# Patient Record
Sex: FEMALE | Race: BLACK OR AFRICAN AMERICAN | NOT HISPANIC OR LATINO | URBAN - METROPOLITAN AREA
[De-identification: names, ages, dates, MRNs, and addresses within clinical notes are randomized per-mention and may not be internally consistent; named-entity substitution may affect disease eponyms.]

---

## 2017-01-02 ENCOUNTER — EMERGENCY (EMERGENCY)
Facility: HOSPITAL | Age: 67
LOS: 1 days | Discharge: HOME | End: 2017-01-02
Attending: EMERGENCY MEDICINE
Payer: MEDICARE

## 2017-01-02 VITALS
HEIGHT: 59 IN | TEMPERATURE: 98 F | HEART RATE: 60 BPM | SYSTOLIC BLOOD PRESSURE: 148 MMHG | WEIGHT: 128.97 LBS | DIASTOLIC BLOOD PRESSURE: 77 MMHG | OXYGEN SATURATION: 100 % | RESPIRATION RATE: 16 BRPM

## 2017-01-02 DIAGNOSIS — Z88.8 ALLERGY STATUS TO OTHER DRUGS, MEDICAMENTS AND BIOLOGICAL SUBSTANCES STATUS: ICD-10-CM

## 2017-01-02 DIAGNOSIS — R51 HEADACHE: ICD-10-CM

## 2017-01-02 DIAGNOSIS — R42 DIZZINESS AND GIDDINESS: ICD-10-CM

## 2017-01-02 DIAGNOSIS — R07.9 CHEST PAIN, UNSPECIFIED: ICD-10-CM

## 2017-01-02 DIAGNOSIS — I10 ESSENTIAL (PRIMARY) HYPERTENSION: ICD-10-CM

## 2017-01-02 LAB
ALBUMIN SERPL ELPH-MCNC: 3.5 G/DL — SIGNIFICANT CHANGE UP (ref 3.3–5)
ALP SERPL-CCNC: 83 U/L — SIGNIFICANT CHANGE UP (ref 40–120)
ALT FLD-CCNC: 29 U/L — SIGNIFICANT CHANGE UP (ref 12–78)
ANION GAP SERPL CALC-SCNC: 5 MMOL/L — SIGNIFICANT CHANGE UP (ref 5–17)
AST SERPL-CCNC: 30 U/L — SIGNIFICANT CHANGE UP (ref 15–37)
BASOPHILS # BLD AUTO: 0.1 K/UL — SIGNIFICANT CHANGE UP (ref 0–0.2)
BASOPHILS NFR BLD AUTO: 1.5 % — SIGNIFICANT CHANGE UP (ref 0–2)
BILIRUB SERPL-MCNC: 0.5 MG/DL — SIGNIFICANT CHANGE UP (ref 0.2–1.2)
BUN SERPL-MCNC: 16 MG/DL — SIGNIFICANT CHANGE UP (ref 7–23)
CALCIUM SERPL-MCNC: 9.1 MG/DL — SIGNIFICANT CHANGE UP (ref 8.5–10.1)
CHLORIDE SERPL-SCNC: 105 MMOL/L — SIGNIFICANT CHANGE UP (ref 96–108)
CK MB BLD-MCNC: 1 % — SIGNIFICANT CHANGE UP (ref 0–3.5)
CK MB CFR SERPL CALC: 2.1 NG/ML — SIGNIFICANT CHANGE UP (ref 0.5–3.6)
CK SERPL-CCNC: 221 U/L — HIGH (ref 26–192)
CO2 SERPL-SCNC: 32 MMOL/L — HIGH (ref 22–31)
CREAT SERPL-MCNC: 0.82 MG/DL — SIGNIFICANT CHANGE UP (ref 0.5–1.3)
EOSINOPHIL # BLD AUTO: 0.1 K/UL — SIGNIFICANT CHANGE UP (ref 0–0.5)
EOSINOPHIL NFR BLD AUTO: 1.2 % — SIGNIFICANT CHANGE UP (ref 0–6)
GLUCOSE SERPL-MCNC: 94 MG/DL — SIGNIFICANT CHANGE UP (ref 70–99)
HCT VFR BLD CALC: 39.3 % — SIGNIFICANT CHANGE UP (ref 34.5–45)
HGB BLD-MCNC: 13.7 G/DL — SIGNIFICANT CHANGE UP (ref 11.5–15.5)
LYMPHOCYTES # BLD AUTO: 2.8 K/UL — SIGNIFICANT CHANGE UP (ref 1–3.3)
LYMPHOCYTES # BLD AUTO: 46.6 % — HIGH (ref 13–44)
MCHC RBC-ENTMCNC: 29.1 PG — SIGNIFICANT CHANGE UP (ref 27–34)
MCHC RBC-ENTMCNC: 34.9 GM/DL — SIGNIFICANT CHANGE UP (ref 32–36)
MCV RBC AUTO: 83.6 FL — SIGNIFICANT CHANGE UP (ref 80–100)
MONOCYTES # BLD AUTO: 0.3 K/UL — SIGNIFICANT CHANGE UP (ref 0–0.9)
MONOCYTES NFR BLD AUTO: 4.7 % — SIGNIFICANT CHANGE UP (ref 2–14)
NEUTROPHILS # BLD AUTO: 2.7 K/UL — SIGNIFICANT CHANGE UP (ref 1.8–7.4)
NEUTROPHILS NFR BLD AUTO: 46 % — SIGNIFICANT CHANGE UP (ref 43–77)
PLATELET # BLD AUTO: 185 K/UL — SIGNIFICANT CHANGE UP (ref 150–400)
POTASSIUM SERPL-MCNC: 4 MMOL/L — SIGNIFICANT CHANGE UP (ref 3.5–5.3)
POTASSIUM SERPL-SCNC: 4 MMOL/L — SIGNIFICANT CHANGE UP (ref 3.5–5.3)
PROT SERPL-MCNC: 7.5 GM/DL — SIGNIFICANT CHANGE UP (ref 6–8.3)
RBC # BLD: 4.7 M/UL — SIGNIFICANT CHANGE UP (ref 3.8–5.2)
RBC # FLD: 11.8 % — SIGNIFICANT CHANGE UP (ref 11–15)
SODIUM SERPL-SCNC: 142 MMOL/L — SIGNIFICANT CHANGE UP (ref 135–145)
TROPONIN I SERPL-MCNC: <.015 NG/ML — SIGNIFICANT CHANGE UP (ref 0.01–0.04)
WBC # BLD: 5.9 K/UL — SIGNIFICANT CHANGE UP (ref 3.8–10.5)
WBC # FLD AUTO: 5.9 K/UL — SIGNIFICANT CHANGE UP (ref 3.8–10.5)

## 2017-01-02 PROCEDURE — 70450 CT HEAD/BRAIN W/O DYE: CPT | Mod: 26

## 2017-01-02 PROCEDURE — 71010: CPT | Mod: 26

## 2017-01-02 PROCEDURE — 99285 EMERGENCY DEPT VISIT HI MDM: CPT

## 2017-01-02 NOTE — ED ADULT NURSE NOTE - OBJECTIVE STATEMENT
on and pff dizziness for weeks left side of head,pulling down to left side of face with tightness,denies chest pain

## 2017-01-02 NOTE — ED PROVIDER NOTE - OBJECTIVE STATEMENT
66yoF; with pmh signif for HTN, HLD; now p/w lightheadedness (x2 days).  c/o headache--frontal headache.  denies blurry vision/double vision.  denies focal weakness. denies numbness/tingling. c/o chest pain--sscp, non-radiating, non-exertional, non-pleuritic, associated with palpitations. denies sob, diaphoresis, or nausea. denies f/c/s. denies cough.

## 2017-01-02 NOTE — ED ADULT TRIAGE NOTE - CHIEF COMPLAINT QUOTE
Patient reports 1 week of dizziness, visual changes and "heaviness in her head."  Denies fall, denies striking head. Symptoms comes and go

## 2017-01-03 VITALS
RESPIRATION RATE: 13 BRPM | DIASTOLIC BLOOD PRESSURE: 60 MMHG | HEART RATE: 49 BPM | SYSTOLIC BLOOD PRESSURE: 121 MMHG | OXYGEN SATURATION: 99 %

## 2017-01-03 LAB
APTT BLD: 28.3 SEC — SIGNIFICANT CHANGE UP (ref 27.5–37.4)
CK MB BLD-MCNC: 0.9 % — SIGNIFICANT CHANGE UP (ref 0–3.5)
CK MB CFR SERPL CALC: 2 NG/ML — SIGNIFICANT CHANGE UP (ref 0.5–3.6)
CK SERPL-CCNC: 218 U/L — HIGH (ref 26–192)
INR BLD: 1.04 RATIO — SIGNIFICANT CHANGE UP (ref 0.88–1.16)
PROTHROM AB SERPL-ACNC: 11.7 SEC — SIGNIFICANT CHANGE UP (ref 10–13.1)
TROPONIN I SERPL-MCNC: <.015 NG/ML — SIGNIFICANT CHANGE UP (ref 0.01–0.04)

## 2017-01-03 PROCEDURE — 93010 ELECTROCARDIOGRAM REPORT: CPT

## 2020-11-20 ENCOUNTER — EMERGENCY (EMERGENCY)
Facility: HOSPITAL | Age: 70
LOS: 0 days | Discharge: HOME | End: 2020-11-20
Attending: EMERGENCY MEDICINE
Payer: MEDICARE

## 2020-11-20 VITALS
DIASTOLIC BLOOD PRESSURE: 77 MMHG | WEIGHT: 130.07 LBS | HEIGHT: 59 IN | OXYGEN SATURATION: 100 % | HEART RATE: 58 BPM | SYSTOLIC BLOOD PRESSURE: 137 MMHG | RESPIRATION RATE: 16 BRPM | TEMPERATURE: 98 F

## 2020-11-20 VITALS
DIASTOLIC BLOOD PRESSURE: 66 MMHG | OXYGEN SATURATION: 100 % | HEART RATE: 61 BPM | RESPIRATION RATE: 12 BRPM | TEMPERATURE: 98 F | SYSTOLIC BLOOD PRESSURE: 141 MMHG

## 2020-11-20 DIAGNOSIS — R07.9 CHEST PAIN, UNSPECIFIED: ICD-10-CM

## 2020-11-20 DIAGNOSIS — Z88.8 ALLERGY STATUS TO OTHER DRUGS, MEDICAMENTS AND BIOLOGICAL SUBSTANCES: ICD-10-CM

## 2020-11-20 DIAGNOSIS — R06.02 SHORTNESS OF BREATH: ICD-10-CM

## 2020-11-20 DIAGNOSIS — I10 ESSENTIAL (PRIMARY) HYPERTENSION: ICD-10-CM

## 2020-11-20 LAB
ALBUMIN SERPL ELPH-MCNC: 3.5 G/DL — SIGNIFICANT CHANGE UP (ref 3.3–5)
ALP SERPL-CCNC: 78 U/L — SIGNIFICANT CHANGE UP (ref 40–120)
ALT FLD-CCNC: 25 U/L — SIGNIFICANT CHANGE UP (ref 12–78)
ANION GAP SERPL CALC-SCNC: 8 MMOL/L — SIGNIFICANT CHANGE UP (ref 5–17)
APTT BLD: 29.4 SEC — SIGNIFICANT CHANGE UP (ref 27.5–35.5)
AST SERPL-CCNC: 28 U/L — SIGNIFICANT CHANGE UP (ref 15–37)
BASOPHILS # BLD AUTO: 0.01 K/UL — SIGNIFICANT CHANGE UP (ref 0–0.2)
BASOPHILS NFR BLD AUTO: 0.2 % — SIGNIFICANT CHANGE UP (ref 0–2)
BILIRUB SERPL-MCNC: 0.6 MG/DL — SIGNIFICANT CHANGE UP (ref 0.2–1.2)
BUN SERPL-MCNC: 14 MG/DL — SIGNIFICANT CHANGE UP (ref 7–23)
CALCIUM SERPL-MCNC: 9.1 MG/DL — SIGNIFICANT CHANGE UP (ref 8.5–10.1)
CHLORIDE SERPL-SCNC: 104 MMOL/L — SIGNIFICANT CHANGE UP (ref 96–108)
CO2 SERPL-SCNC: 26 MMOL/L — SIGNIFICANT CHANGE UP (ref 22–31)
CREAT SERPL-MCNC: 0.73 MG/DL — SIGNIFICANT CHANGE UP (ref 0.5–1.3)
D DIMER BLD IA.RAPID-MCNC: <150 NG/ML DDU — SIGNIFICANT CHANGE UP
EOSINOPHIL # BLD AUTO: 0.04 K/UL — SIGNIFICANT CHANGE UP (ref 0–0.5)
EOSINOPHIL NFR BLD AUTO: 0.7 % — SIGNIFICANT CHANGE UP (ref 0–6)
GLUCOSE SERPL-MCNC: 80 MG/DL — SIGNIFICANT CHANGE UP (ref 70–99)
HCT VFR BLD CALC: 39.8 % — SIGNIFICANT CHANGE UP (ref 34.5–45)
HGB BLD-MCNC: 13.1 G/DL — SIGNIFICANT CHANGE UP (ref 11.5–15.5)
IMM GRANULOCYTES NFR BLD AUTO: 0 % — SIGNIFICANT CHANGE UP (ref 0–1.5)
INR BLD: 1.07 RATIO — SIGNIFICANT CHANGE UP (ref 0.88–1.16)
LIDOCAIN IGE QN: 119 U/L — SIGNIFICANT CHANGE UP (ref 73–393)
LYMPHOCYTES # BLD AUTO: 1.69 K/UL — SIGNIFICANT CHANGE UP (ref 1–3.3)
LYMPHOCYTES # BLD AUTO: 28.7 % — SIGNIFICANT CHANGE UP (ref 13–44)
MCHC RBC-ENTMCNC: 28.8 PG — SIGNIFICANT CHANGE UP (ref 27–34)
MCHC RBC-ENTMCNC: 32.9 GM/DL — SIGNIFICANT CHANGE UP (ref 32–36)
MCV RBC AUTO: 87.5 FL — SIGNIFICANT CHANGE UP (ref 80–100)
MONOCYTES # BLD AUTO: 0.45 K/UL — SIGNIFICANT CHANGE UP (ref 0–0.9)
MONOCYTES NFR BLD AUTO: 7.6 % — SIGNIFICANT CHANGE UP (ref 2–14)
NEUTROPHILS # BLD AUTO: 3.7 K/UL — SIGNIFICANT CHANGE UP (ref 1.8–7.4)
NEUTROPHILS NFR BLD AUTO: 62.8 % — SIGNIFICANT CHANGE UP (ref 43–77)
NRBC # BLD: 0 /100 WBCS — SIGNIFICANT CHANGE UP (ref 0–0)
NT-PROBNP SERPL-SCNC: 106 PG/ML — SIGNIFICANT CHANGE UP (ref 0–125)
PLATELET # BLD AUTO: 197 K/UL — SIGNIFICANT CHANGE UP (ref 150–400)
POTASSIUM SERPL-MCNC: 3.5 MMOL/L — SIGNIFICANT CHANGE UP (ref 3.5–5.3)
POTASSIUM SERPL-SCNC: 3.5 MMOL/L — SIGNIFICANT CHANGE UP (ref 3.5–5.3)
PROT SERPL-MCNC: 7.9 GM/DL — SIGNIFICANT CHANGE UP (ref 6–8.3)
PROTHROM AB SERPL-ACNC: 12.4 SEC — SIGNIFICANT CHANGE UP (ref 10.6–13.6)
RBC # BLD: 4.55 M/UL — SIGNIFICANT CHANGE UP (ref 3.8–5.2)
RBC # FLD: 13.2 % — SIGNIFICANT CHANGE UP (ref 10.3–14.5)
SODIUM SERPL-SCNC: 138 MMOL/L — SIGNIFICANT CHANGE UP (ref 135–145)
TROPONIN I SERPL-MCNC: <.015 NG/ML — SIGNIFICANT CHANGE UP (ref 0.01–0.04)
TROPONIN I SERPL-MCNC: <.015 NG/ML — SIGNIFICANT CHANGE UP (ref 0.01–0.04)
WBC # BLD: 5.89 K/UL — SIGNIFICANT CHANGE UP (ref 3.8–10.5)
WBC # FLD AUTO: 5.89 K/UL — SIGNIFICANT CHANGE UP (ref 3.8–10.5)

## 2020-11-20 PROCEDURE — 99285 EMERGENCY DEPT VISIT HI MDM: CPT

## 2020-11-20 PROCEDURE — 93010 ELECTROCARDIOGRAM REPORT: CPT

## 2020-11-20 PROCEDURE — 71045 X-RAY EXAM CHEST 1 VIEW: CPT | Mod: 26

## 2020-11-20 RX ORDER — FUROSEMIDE 40 MG
1 TABLET ORAL
Qty: 0 | Refills: 0 | DISCHARGE

## 2020-11-20 RX ORDER — ASPIRIN/CALCIUM CARB/MAGNESIUM 324 MG
325 TABLET ORAL ONCE
Refills: 0 | Status: COMPLETED | OUTPATIENT
Start: 2020-11-20 | End: 2020-11-20

## 2020-11-20 RX ADMIN — Medication 325 MILLIGRAM(S): at 17:53

## 2020-11-20 NOTE — ED ADULT TRIAGE NOTE - CHIEF COMPLAINT QUOTE
68 y/o female with PMH of HTN. Presents tot  Ed with c/c of dizziness, & chest tightness in the midsternum area that came on suddenly this morning while ambulating. The pain is non radiating and has gotten worst over the last hour with SOB.

## 2020-11-20 NOTE — ED ADULT NURSE NOTE - CHPI ED NUR DURATION
How Severe Is Your Skin Lesion?: moderate
Has Your Skin Lesion Been Treated?: not been treated
Is This A New Presentation, Or A Follow-Up?: Skin Lesions
Additional History: Mother states without the medication more would pop up \\n\\nMother states that 2 swab testing was done, mother was told
today

## 2020-11-20 NOTE — ED PROVIDER NOTE - PATIENT PORTAL LINK FT
You can access the FollowMyHealth Patient Portal offered by [facility] by registering at the following website: [email or web address]. By joining [employer]’s FollowMyHealth portal, you will also be able to view your health information using other applications (apps) compatible with our system.

## 2020-11-20 NOTE — ED ADULT NURSE NOTE - NURSING NEURO ORIENTATION
No concerns today, pap and GC/CT collected. Recently had cold, will use Vicks/acetaminophen/Mucinex DM. Bedside sono with active movement. RTC in 5 weeks  
oriented to person, place and time

## 2020-11-20 NOTE — ED ADULT NURSE NOTE - OBJECTIVE STATEMENT
A&O4, ambulating. pt c/o midsternal chest pain, intermittent, started 10:30AM today while cooking in kitchen, pressure/tightness. pain 8/10. pt denies sob/headache/dizziness/fevers/chills. pt denies falls/injuries

## 2020-11-20 NOTE — ED PROVIDER NOTE - PROGRESS NOTE DETAILS
Pt is pain free, and labs wnl. Pt offered admission, but pt declines, requests to go home. Understands that she needs to continue to f/u w cardiologist, and to return to ED if chest pain returns. Pt is comfortable, and eager for d/c. Has capacity to decline admission.

## 2020-11-20 NOTE — ED PROVIDER NOTE - CLINICAL SUMMARY MEDICAL DECISION MAKING FREE TEXT BOX
Ddx: ro ACS, HEART score 4, ro PE  Plan: Cbc, cmp, cxr, ecg, ddimer, pt/ptt, pt declines pain medications currently Ddx: ro ACS, HEART score 2, ro PE  Plan: Cbc, cmp, cxr, ecg, ddimer, pt/ptt, pt declines pain medications currently

## 2020-11-20 NOTE — ED PROVIDER NOTE - OBJECTIVE STATEMENT
Pt is a 68 yo lady with a pmhx of HTN who presents to the ED with chest pain. It started around 10:30 this morning. Had some SOB and a pressure pain. Has been intermittent. No hx of dvt/pe. No cough, no fevers. No abdominal pain. Pain is in the center of chest. Feels better now.

## 2020-11-20 NOTE — ED PROVIDER NOTE - CARE PROVIDER_API CALL
Jarad Doshi  CARDIOLOGY  [address], [address] [place] [address]  Phone: [phone]  Fax: [phone]  Follow Up Time: 1-3 Days

## 2022-02-26 ENCOUNTER — INPATIENT (INPATIENT)
Facility: HOSPITAL | Age: 72
LOS: 5 days | Discharge: HOME | End: 2022-03-04
Attending: INTERNAL MEDICINE | Admitting: INTERNAL MEDICINE
Payer: MEDICARE

## 2022-02-26 VITALS
HEIGHT: 59 IN | RESPIRATION RATE: 16 BRPM | DIASTOLIC BLOOD PRESSURE: 67 MMHG | OXYGEN SATURATION: 99 % | HEART RATE: 106 BPM | SYSTOLIC BLOOD PRESSURE: 131 MMHG | TEMPERATURE: 98 F | WEIGHT: 126.1 LBS

## 2022-02-26 LAB
ABO RH CONFIRMATION: SIGNIFICANT CHANGE UP
ACANTHOCYTES BLD QL SMEAR: SLIGHT — SIGNIFICANT CHANGE UP
ALBUMIN SERPL ELPH-MCNC: 3.6 G/DL — SIGNIFICANT CHANGE UP (ref 3.3–5)
ALP SERPL-CCNC: 56 U/L — SIGNIFICANT CHANGE UP (ref 40–120)
ALT FLD-CCNC: 79 U/L — HIGH (ref 12–78)
ANION GAP SERPL CALC-SCNC: 8 MMOL/L — SIGNIFICANT CHANGE UP (ref 5–17)
ANISOCYTOSIS BLD QL: SIGNIFICANT CHANGE UP
AST SERPL-CCNC: 166 U/L — HIGH (ref 15–37)
BASO STIPL BLD QL SMEAR: PRESENT — SIGNIFICANT CHANGE UP
BASOPHILS # BLD AUTO: 0 K/UL — SIGNIFICANT CHANGE UP (ref 0–0.2)
BASOPHILS NFR BLD AUTO: 0 % — SIGNIFICANT CHANGE UP (ref 0–2)
BILIRUB SERPL-MCNC: 2.1 MG/DL — HIGH (ref 0.2–1.2)
BLD GP AB SCN SERPL QL: SIGNIFICANT CHANGE UP
BUN SERPL-MCNC: 19 MG/DL — SIGNIFICANT CHANGE UP (ref 7–23)
CALCIUM SERPL-MCNC: 8.9 MG/DL — SIGNIFICANT CHANGE UP (ref 8.5–10.1)
CHLORIDE SERPL-SCNC: 106 MMOL/L — SIGNIFICANT CHANGE UP (ref 96–108)
CO2 SERPL-SCNC: 23 MMOL/L — SIGNIFICANT CHANGE UP (ref 22–31)
CREAT SERPL-MCNC: 0.94 MG/DL — SIGNIFICANT CHANGE UP (ref 0.5–1.3)
DACRYOCYTES BLD QL SMEAR: SLIGHT — SIGNIFICANT CHANGE UP
ELLIPTOCYTES BLD QL SMEAR: SLIGHT — SIGNIFICANT CHANGE UP
EOSINOPHIL # BLD AUTO: 0.05 K/UL — SIGNIFICANT CHANGE UP (ref 0–0.5)
EOSINOPHIL NFR BLD AUTO: 1 % — SIGNIFICANT CHANGE UP (ref 0–6)
FLUAV AG NPH QL: SIGNIFICANT CHANGE UP
FLUBV AG NPH QL: SIGNIFICANT CHANGE UP
GLUCOSE SERPL-MCNC: 121 MG/DL — HIGH (ref 70–99)
HCT VFR BLD CALC: 13.4 % — CRITICAL LOW (ref 34.5–45)
HCT VFR BLD CALC: 13.5 % — CRITICAL LOW (ref 34.5–45)
HGB BLD-MCNC: 4.2 G/DL — CRITICAL LOW (ref 11.5–15.5)
HGB BLD-MCNC: 4.3 G/DL — CRITICAL LOW (ref 11.5–15.5)
HYPOCHROMIA BLD QL: SIGNIFICANT CHANGE UP
INR BLD: 1.26 RATIO — HIGH (ref 0.88–1.16)
LYMPHOCYTES # BLD AUTO: 1.62 K/UL — SIGNIFICANT CHANGE UP (ref 1–3.3)
LYMPHOCYTES # BLD AUTO: 35 % — SIGNIFICANT CHANGE UP (ref 13–44)
MACROCYTES BLD QL: SLIGHT — SIGNIFICANT CHANGE UP
MANUAL SMEAR VERIFICATION: SIGNIFICANT CHANGE UP
MCHC RBC-ENTMCNC: 31.3 G/DL — LOW (ref 32–36)
MCHC RBC-ENTMCNC: 31.9 G/DL — LOW (ref 32–36)
MCHC RBC-ENTMCNC: 33.3 PG — SIGNIFICANT CHANGE UP (ref 27–34)
MCHC RBC-ENTMCNC: 33.9 PG — SIGNIFICANT CHANGE UP (ref 27–34)
MCV RBC AUTO: 106.3 FL — HIGH (ref 80–100)
MCV RBC AUTO: 106.3 FL — HIGH (ref 80–100)
METAMYELOCYTES # FLD: 1 % — HIGH (ref 0–0)
MICROCYTES BLD QL: SIGNIFICANT CHANGE UP
MONOCYTES # BLD AUTO: 0.19 K/UL — SIGNIFICANT CHANGE UP (ref 0–0.9)
MONOCYTES NFR BLD AUTO: 4 % — SIGNIFICANT CHANGE UP (ref 2–14)
NEUTROPHILS # BLD AUTO: 2.73 K/UL — SIGNIFICANT CHANGE UP (ref 1.8–7.4)
NEUTROPHILS NFR BLD AUTO: 58 % — SIGNIFICANT CHANGE UP (ref 43–77)
NEUTS BAND # BLD: 1 % — SIGNIFICANT CHANGE UP (ref 0–8)
NRBC # BLD: 4 /100 WBCS — HIGH (ref 0–0)
NRBC # BLD: 7 /100 — HIGH (ref 0–0)
NRBC # BLD: SIGNIFICANT CHANGE UP /100 WBCS (ref 0–0)
OB PNL STL: NEGATIVE — SIGNIFICANT CHANGE UP
OVALOCYTES BLD QL SMEAR: SLIGHT — SIGNIFICANT CHANGE UP
PLAT MORPH BLD: NORMAL — SIGNIFICANT CHANGE UP
PLATELET # BLD AUTO: 35 K/UL — LOW (ref 150–400)
PLATELET # BLD AUTO: 38 K/UL — LOW (ref 150–400)
PLATELET COUNT - ESTIMATE: ABNORMAL
POIKILOCYTOSIS BLD QL AUTO: SIGNIFICANT CHANGE UP
POLYCHROMASIA BLD QL SMEAR: SLIGHT — SIGNIFICANT CHANGE UP
POTASSIUM SERPL-MCNC: 4.3 MMOL/L — SIGNIFICANT CHANGE UP (ref 3.5–5.3)
POTASSIUM SERPL-SCNC: 4.3 MMOL/L — SIGNIFICANT CHANGE UP (ref 3.5–5.3)
PROT SERPL-MCNC: 6.9 GM/DL — SIGNIFICANT CHANGE UP (ref 6–8.3)
PROTHROM AB SERPL-ACNC: 15 SEC — HIGH (ref 10.5–13.4)
RBC # BLD: 1.25 M/UL — LOW (ref 3.8–5.2)
RBC # BLD: 1.26 M/UL — LOW (ref 3.8–5.2)
RBC # BLD: 1.27 M/UL — LOW (ref 3.8–5.2)
RBC # FLD: 28.1 % — HIGH (ref 10.3–14.5)
RBC # FLD: 28.5 % — HIGH (ref 10.3–14.5)
RBC BLD AUTO: ABNORMAL
RETICS #: 23.6 K/UL — LOW (ref 25–125)
RETICS/RBC NFR: 1.9 % — SIGNIFICANT CHANGE UP (ref 0.5–2.5)
SARS-COV-2 RNA SPEC QL NAA+PROBE: SIGNIFICANT CHANGE UP
SCHISTOCYTES BLD QL AUTO: SIGNIFICANT CHANGE UP
SMUDGE CELLS # BLD: PRESENT — SIGNIFICANT CHANGE UP
SODIUM SERPL-SCNC: 137 MMOL/L — SIGNIFICANT CHANGE UP (ref 135–145)
TARGETS BLD QL SMEAR: SLIGHT — SIGNIFICANT CHANGE UP
WBC # BLD: 4.63 K/UL — SIGNIFICANT CHANGE UP (ref 3.8–10.5)
WBC # BLD: 4.86 K/UL — SIGNIFICANT CHANGE UP (ref 3.8–10.5)
WBC # FLD AUTO: 4.63 K/UL — SIGNIFICANT CHANGE UP (ref 3.8–10.5)
WBC # FLD AUTO: 4.86 K/UL — SIGNIFICANT CHANGE UP (ref 3.8–10.5)

## 2022-02-26 PROCEDURE — 99285 EMERGENCY DEPT VISIT HI MDM: CPT

## 2022-02-26 PROCEDURE — 74177 CT ABD & PELVIS W/CONTRAST: CPT | Mod: 26

## 2022-02-26 PROCEDURE — 93010 ELECTROCARDIOGRAM REPORT: CPT

## 2022-02-26 PROCEDURE — 99222 1ST HOSP IP/OBS MODERATE 55: CPT

## 2022-02-26 PROCEDURE — 71045 X-RAY EXAM CHEST 1 VIEW: CPT | Mod: 26

## 2022-02-26 PROCEDURE — 99223 1ST HOSP IP/OBS HIGH 75: CPT

## 2022-02-26 RX ORDER — ACETAMINOPHEN 500 MG
650 TABLET ORAL EVERY 6 HOURS
Refills: 0 | Status: DISCONTINUED | OUTPATIENT
Start: 2022-02-26 | End: 2022-03-04

## 2022-02-26 RX ORDER — PANTOPRAZOLE SODIUM 20 MG/1
40 TABLET, DELAYED RELEASE ORAL
Refills: 0 | Status: DISCONTINUED | OUTPATIENT
Start: 2022-02-26 | End: 2022-03-04

## 2022-02-26 RX ORDER — METOPROLOL TARTRATE 50 MG
25 TABLET ORAL DAILY
Refills: 0 | Status: DISCONTINUED | OUTPATIENT
Start: 2022-02-26 | End: 2022-03-04

## 2022-02-26 RX ADMIN — Medication 650 MILLIGRAM(S): at 13:59

## 2022-02-26 NOTE — ED PROVIDER NOTE - CLINICAL SUMMARY MEDICAL DECISION MAKING FREE TEXT BOX
Pt with reports of critical anemia, check CBC, Guaiac, if critical anemia, order for transfusion and admitted for further care. Pt with reports of critical anemia, check CBC, Guaiac, if critical anemia, order for transfusion and admitted for further care and workup.

## 2022-02-26 NOTE — ED PROVIDER NOTE - OBJECTIVE STATEMENT
Pt is a 70 y/o F with PMHx of HTN who presents to the ED, sent by her PMD for abnormal labs. Pt states she had her blood drawn x3 days ago and received a call last night saying she had a Hg level of 4 and she needed to come to the ED. Denies CP, palpitations, SOB, syncope, abd pain, N/V/D, vaginal bleeding or discharge, states "something feels off" and endorses slight fatigue more than usual. Pt reports hx of colonoscopies in the past which were normal.

## 2022-02-26 NOTE — PATIENT PROFILE ADULT - DEAF OR HARD OF HEARING?
no Class I (easy) - visualization of the soft palate, fauces, uvula, and both anterior and posterior pillars

## 2022-02-26 NOTE — ED PROVIDER NOTE - CPE EDP ENMT NORM
normal... all labs wnl, will d. c home with supportive care and to return to therapist if needed, Clement Ham MD

## 2022-02-26 NOTE — H&P ADULT - NSHPLABSRESULTS_GEN_ALL_CORE
LABS:                        4.3    4.63  )-----------( 38       ( 26 Feb 2022 09:42 )             x        02-26    137  |  106  |  19  ----------------------------<  121<H>  4.3   |  23  |  0.94    Ca    8.9      26 Feb 2022 09:42    TPro  6.9  /  Alb  3.6  /  TBili  2.1<H>  /  DBili  x   /  AST  166<H>  /  ALT  79<H>  /  AlkPhos  56  02-26    PT/INR - ( 26 Feb 2022 09:42 )   PT: 15.0 sec;   INR: 1.26 ratio

## 2022-02-26 NOTE — ED ADULT NURSE NOTE - PLAN OF CARE DISCUSSED WITH:
Informed patient, verbalized understanding.   ----- Message from Michoacano Foster MD sent at 11/17/2020  5:15 PM CST -----  Normal CBC, CMP, vitamin B12.  MA to call with result.  I will be happy to talk to him if he has any questions.     Patient

## 2022-02-26 NOTE — ED ADULT NURSE REASSESSMENT NOTE - NS ED NURSE REASSESS COMMENT FT1
Pt sent to 1B after CT with RN report given By Omaira before lunch
pt A&O no c/o CT called and available for pt Pt taken with RN due to blood infusing to CT
Patient pending transfusion for PRBC. Specimen collected and sent to [facility] for cross match at 1136 am. Pt alert oriented x 3 . Pt denies headache denies dizziness denies shortness of breath. Pt admitted . Awaiting bed assignment. Safety maintained. Bed in low position. Repeat CBC sent to lab.

## 2022-02-26 NOTE — ED ADULT NURSE NOTE - SUICIDE SCREENING QUESTION 3
Care Management Plan 11/28/2018   Lifestyle Goals Eat a healthy diet;Routine eye exam;Self monitor blood sugar;Self monitor blood pressure;Routine foot care;Routine follow-up with doctor(s)   Barriers Disease education   Self Management Get flu/pneumonia shot;Home Glucose Monitoring;Home BP Monitoring;Medication Adherence   Suggested Appointments Other (See Comment)   Suggested Appointments Follow with Providers as recommended   Annual Wellness Visit:  Patient Has Completed   Specific Disease Process Teaching Diabetes   Specific Disease Process Teaching Review falls risk, medication adherence, appointments     The main concerns and/or symptoms the patient would like to address are:Disease education and care coordination    Education/instruction provided by Care Coordinator: Assessment and Care Plan created w/Patient this session.  AWV current, My chart declined, not interested in ACP    Follow Up Outreach Due: Routine    Tiffanie Garza RN    
Note pending.  Care Management tab is not optimized.    
No

## 2022-02-26 NOTE — H&P ADULT - NSHPPHYSICALEXAM_GEN_ALL_CORE
PHYSICAL EXAMINATION:  Vital Signs Last 24 Hrs  T(C): 36.6 (26 Feb 2022 08:08), Max: 36.6 (26 Feb 2022 08:08)  T(F): 97.8 (26 Feb 2022 08:08), Max: 97.8 (26 Feb 2022 08:08)  HR: 68 (26 Feb 2022 11:37) (62 - 106)  BP: 119/59 (26 Feb 2022 11:37) (119/59 - 131/67)  BP(mean): --  RR: 17 (26 Feb 2022 11:37) (16 - 17)  SpO2: 100% (26 Feb 2022 11:37) (98% - 100%)  CAPILLARY BLOOD GLUCOSE            GENERAL: NAD, well-groomed,  HEAD:  atraumatic, normocephalic  EYES: sclera anicteric  ENMT: mucous membranes moist  NECK: supple, No JVD  CHEST/LUNG: clear to auscultation bilaterally;    no      rales   ,   no rhonchi,   HEART: normal S1, S2  ABDOMEN: BS+, soft, ND, NT   EXTREMITIES:    no    edema    b/l LEs  NEURO: awake, ,     moves all extremities  SKIN: no     rash

## 2022-02-26 NOTE — ED ADULT TRIAGE NOTE - CHIEF COMPLAINT QUOTE
Patient spoke with PMD last night, was told to come to ED this morning for abnormal lab work. Patient reports Vitamin D level was low.  Denies any other complaints other than "when I walk I get Tired." Patient spoke with PMD last night, was told to come to ED this morning for abnormal lab work. Patient reports Vitamin D level was low.  Denies any other complaints other than "when I walk I get Tired." Patient corrected self and stated HEr "hemoglobin is low."

## 2022-02-26 NOTE — ED ADULT NURSE NOTE - INTERVENTIONS DEFINITIONS
[place] to call system/Call bell, personal items and telephone within reach/Instruct patient to call for assistance/Room bathroom lighting operational/Non-slip footwear when patient is off stretcher/Physically safe environment: no spills, clutter or unnecessary equipment/Stretcher in lowest position, wheels locked, appropriate side rails in place/Monitor gait and stability/Provide visual clues: red socks

## 2022-02-26 NOTE — ED ADULT NURSE NOTE - CHIEF COMPLAINT QUOTE
Patient spoke with PMD last night, was told to come to ED this morning for abnormal lab work. Patient reports Vitamin D level was low.  Denies any other complaints other than "when I walk I get Tired." Patient corrected self and stated HEr "hemoglobin is low."

## 2022-02-26 NOTE — ED ADULT NURSE NOTE - OBJECTIVE STATEMENT
pt states" I have been feeling weak when I stand up for approximately a week and a half , especially my knees- no pain just feel like im going to collapse and poor appetite"Pt denies dizziness or headache.

## 2022-02-26 NOTE — H&P ADULT - HISTORY OF PRESENT ILLNESS
70 y/o F     with PMHx of HTN      who presents to the ED, sent by her PMD for abnormal labs.     Pt states she had her blood drawn x3 days ago and received a call last night saying she had a Hg level of 4 and she needed to come to the ED    . Denies CP, palpitations, SOB, syncope, abd pain, N/V/D,  melena/  brbpr,    states "something feels off" and endorses slight fatigue more than usual.     Pt reports hx of colonoscopies in the past.  normal

## 2022-02-26 NOTE — H&P ADULT - ASSESSMENT
72 y/o F     with PMHx of HTN     who presents to the ED, sent by her PMD for abnormal labs.     Pt states she had her blood drawn x3 days ago and received a call last night saying she had a Hg level of 4 and she needed to come to the ED   . Denies CP, palpitations, SOB, syncope, abd pain, N/V/D,  melena/  brbpr,    states "something feels off" and endorses slight fatigue more than usual.     Pt reports hx of colonoscopies in the past.  normal         * admitted   with anemia  and  fatigue   denies  any melena/  brbpr   guaiac is  negative   in er   hb  was  4 on arrival, plt count of  38,000   prbc's/  follow  serial  hb. on protonix   Gi and Heme dr holguin called  suspect MDS vs   myelophthisic  process,    will need  bone  marrow  bx    * HTN.  sbp  is  119    toprol dose lowered   Ct a/p, ordered   *  on dvt  ppx/  pas              70 y/o F     with PMHx of HTN     who presents to the ED, sent by her PMD for abnormal labs.     Pt states she had her blood drawn x3 days ago and received a call last night saying she had a Hg level of 4 and she needed to come to the ED   . Denies CP, palpitations, SOB, syncope, abd pain, N/V/D,  melena/  brbpr,    states "something feels off" and endorses slight fatigue more than usual.     Pt reports hx of colonoscopies in the past.  normal         * admitted   with anemia  and  fatigue  with conjunctival  pallor   denies  any melena/  brbpr   guaiac is  negative   in er   hb  was  4 on arrival, plt count of  38,000   prbc's/  follow  serial  hb. on protonix   Gi dr aguilar ,   and Heme dr holguin called  suspect MDS vs   myelophthisic  process,    MCV is  106.  check B 12/  folate  will need  bone  marrow  bx  *  elevated lfts   CT a/p,  ordered    * HTN.  sbp  is  119    toprol dose lowered   Ct a/p, ordered   *  on dvt  ppx/  pas

## 2022-02-27 DIAGNOSIS — D51.9 VITAMIN B12 DEFICIENCY ANEMIA, UNSPECIFIED: ICD-10-CM

## 2022-02-27 LAB
ALBUMIN SERPL ELPH-MCNC: 4 G/DL — SIGNIFICANT CHANGE UP (ref 3.3–5)
ALP SERPL-CCNC: 67 U/L — SIGNIFICANT CHANGE UP (ref 40–120)
ALT FLD-CCNC: 84 U/L — HIGH (ref 12–78)
APTT BLD: 23.1 SEC — LOW (ref 27.5–35.5)
APTT BLD: 23.8 SEC — LOW (ref 27.5–35.5)
AST SERPL-CCNC: 150 U/L — HIGH (ref 15–37)
BASOPHILS # BLD AUTO: 0 K/UL — SIGNIFICANT CHANGE UP (ref 0–0.2)
BASOPHILS NFR BLD AUTO: 0 % — SIGNIFICANT CHANGE UP (ref 0–2)
BILIRUB DIRECT SERPL-MCNC: 0.5 MG/DL — HIGH (ref 0–0.3)
BILIRUB INDIRECT FLD-MCNC: 2.3 MG/DL — HIGH (ref 0.2–1)
BILIRUB SERPL-MCNC: 2.8 MG/DL — HIGH (ref 0.2–1.2)
DIR ANTIGLOB POLYSPECIFIC INTERPRETATION: SIGNIFICANT CHANGE UP
EOSINOPHIL # BLD AUTO: 0.07 K/UL — SIGNIFICANT CHANGE UP (ref 0–0.5)
EOSINOPHIL NFR BLD AUTO: 2 % — SIGNIFICANT CHANGE UP (ref 0–6)
FERRITIN SERPL-MCNC: 481 NG/ML — HIGH (ref 15–150)
FIBRINOGEN PPP-MCNC: 387 MG/DL — SIGNIFICANT CHANGE UP (ref 340–550)
FIBRINOGEN PPP-MCNC: 440 MG/DL — SIGNIFICANT CHANGE UP (ref 340–550)
FOLATE SERPL-MCNC: 9.3 NG/ML — SIGNIFICANT CHANGE UP
FOLATE SERPL-MCNC: 9.3 NG/ML — SIGNIFICANT CHANGE UP
HAPTOGLOB SERPL-MCNC: <20 MG/DL — LOW (ref 34–200)
HCT VFR BLD CALC: 21.3 % — LOW (ref 34.5–45)
HCT VFR BLD CALC: 23.5 % — LOW (ref 34.5–45)
HCT VFR BLD CALC: 23.5 % — LOW (ref 34.5–45)
HCV AB S/CO SERPL IA: 0.11 S/CO — SIGNIFICANT CHANGE UP (ref 0–0.99)
HCV AB SERPL-IMP: SIGNIFICANT CHANGE UP
HGB BLD-MCNC: 7.3 G/DL — LOW (ref 11.5–15.5)
HGB BLD-MCNC: 7.9 G/DL — LOW (ref 11.5–15.5)
HGB BLD-MCNC: 8 G/DL — LOW (ref 11.5–15.5)
IMM GRANULOCYTES NFR BLD AUTO: 1.2 % — SIGNIFICANT CHANGE UP (ref 0–1.5)
INR BLD: 1.15 RATIO — SIGNIFICANT CHANGE UP (ref 0.88–1.16)
IRON SATN MFR SERPL: 240 UG/DL — HIGH (ref 30–160)
IRON SATN MFR SERPL: 89 % — HIGH (ref 14–50)
LDH SERPL L TO P-CCNC: 5686 U/L — HIGH (ref 50–242)
LDH SERPL L TO P-CCNC: 5708 U/L — HIGH (ref 50–242)
LYMPHOCYTES # BLD AUTO: 1.32 K/UL — SIGNIFICANT CHANGE UP (ref 1–3.3)
LYMPHOCYTES # BLD AUTO: 38.6 % — SIGNIFICANT CHANGE UP (ref 13–44)
MCHC RBC-ENTMCNC: 30.9 PG — SIGNIFICANT CHANGE UP (ref 27–34)
MCHC RBC-ENTMCNC: 31 PG — SIGNIFICANT CHANGE UP (ref 27–34)
MCHC RBC-ENTMCNC: 31.7 PG — SIGNIFICANT CHANGE UP (ref 27–34)
MCHC RBC-ENTMCNC: 33.6 G/DL — SIGNIFICANT CHANGE UP (ref 32–36)
MCHC RBC-ENTMCNC: 34 G/DL — SIGNIFICANT CHANGE UP (ref 32–36)
MCHC RBC-ENTMCNC: 34.3 G/DL — SIGNIFICANT CHANGE UP (ref 32–36)
MCV RBC AUTO: 90.7 FL — SIGNIFICANT CHANGE UP (ref 80–100)
MCV RBC AUTO: 92.2 FL — SIGNIFICANT CHANGE UP (ref 80–100)
MCV RBC AUTO: 92.6 FL — SIGNIFICANT CHANGE UP (ref 80–100)
MONOCYTES # BLD AUTO: 0.25 K/UL — SIGNIFICANT CHANGE UP (ref 0–0.9)
MONOCYTES NFR BLD AUTO: 7.3 % — SIGNIFICANT CHANGE UP (ref 2–14)
NEUTROPHILS # BLD AUTO: 1.74 K/UL — LOW (ref 1.8–7.4)
NEUTROPHILS NFR BLD AUTO: 50.9 % — SIGNIFICANT CHANGE UP (ref 43–77)
NRBC # BLD: 4 /100 WBCS — HIGH (ref 0–0)
NRBC # BLD: 4 /100 WBCS — HIGH (ref 0–0)
NRBC # BLD: 5 /100 WBCS — HIGH (ref 0–0)
PLATELET # BLD AUTO: 28 K/UL — LOW (ref 150–400)
PLATELET # BLD AUTO: 31 K/UL — LOW (ref 150–400)
PLATELET # BLD AUTO: 53 K/UL — LOW (ref 150–400)
PROT SERPL-MCNC: 7.4 GM/DL — SIGNIFICANT CHANGE UP (ref 6–8.3)
PROTHROM AB SERPL-ACNC: 13.7 SEC — HIGH (ref 10.5–13.4)
RBC # BLD: 2.3 M/UL — LOW (ref 3.8–5.2)
RBC # BLD: 2.55 M/UL — LOW (ref 3.8–5.2)
RBC # BLD: 2.55 M/UL — LOW (ref 3.8–5.2)
RBC # BLD: 2.59 M/UL — LOW (ref 3.8–5.2)
RBC # FLD: 24.3 % — HIGH (ref 10.3–14.5)
RBC # FLD: 25.2 % — HIGH (ref 10.3–14.5)
RBC # FLD: 25.2 % — HIGH (ref 10.3–14.5)
RETICS #: 33.4 K/UL — SIGNIFICANT CHANGE UP (ref 25–125)
RETICS/RBC NFR: 1.3 % — SIGNIFICANT CHANGE UP (ref 0.5–2.5)
TIBC SERPL-MCNC: 270 UG/DL — SIGNIFICANT CHANGE UP (ref 220–430)
TSH SERPL-MCNC: 1.26 UU/ML — SIGNIFICANT CHANGE UP (ref 0.36–3.74)
UIBC SERPL-MCNC: 30 UG/DL — LOW (ref 110–370)
VIT B12 SERPL-MCNC: <150 PG/ML — LOW (ref 232–1245)
VIT B12 SERPL-MCNC: <150 PG/ML — LOW (ref 232–1245)
WBC # BLD: 3.42 K/UL — LOW (ref 3.8–10.5)
WBC # BLD: 4.03 K/UL — SIGNIFICANT CHANGE UP (ref 3.8–10.5)
WBC # BLD: 4.2 K/UL — SIGNIFICANT CHANGE UP (ref 3.8–10.5)
WBC # FLD AUTO: 3.42 K/UL — LOW (ref 3.8–10.5)
WBC # FLD AUTO: 4.03 K/UL — SIGNIFICANT CHANGE UP (ref 3.8–10.5)
WBC # FLD AUTO: 4.2 K/UL — SIGNIFICANT CHANGE UP (ref 3.8–10.5)

## 2022-02-27 PROCEDURE — 99233 SBSQ HOSP IP/OBS HIGH 50: CPT

## 2022-02-27 PROCEDURE — 88189 FLOWCYTOMETRY/READ 16 & >: CPT

## 2022-02-27 RX ORDER — FOLIC ACID 0.8 MG
1 TABLET ORAL DAILY
Refills: 0 | Status: DISCONTINUED | OUTPATIENT
Start: 2022-02-27 | End: 2022-03-04

## 2022-02-27 RX ORDER — PREGABALIN 225 MG/1
1000 CAPSULE ORAL EVERY 24 HOURS
Refills: 0 | Status: DISCONTINUED | OUTPATIENT
Start: 2022-02-28 | End: 2022-03-04

## 2022-02-27 RX ORDER — PREGABALIN 225 MG/1
1000 CAPSULE ORAL ONCE
Refills: 0 | Status: COMPLETED | OUTPATIENT
Start: 2022-02-27 | End: 2022-02-27

## 2022-02-27 RX ADMIN — PANTOPRAZOLE SODIUM 40 MILLIGRAM(S): 20 TABLET, DELAYED RELEASE ORAL at 07:28

## 2022-02-27 RX ADMIN — Medication 1 MILLIGRAM(S): at 10:58

## 2022-02-27 RX ADMIN — PREGABALIN 1000 MICROGRAM(S): 225 CAPSULE ORAL at 10:59

## 2022-02-27 RX ADMIN — Medication 25 MILLIGRAM(S): at 05:58

## 2022-02-27 NOTE — PROGRESS NOTE ADULT - ASSESSMENT
71 Female sent to ER for symptomatic anemia.    1.  Anemia, macrocytic.  No overt or occult bleeding noted.  Noted to have B12 deficiency.  CT A/P with mild gastric wall thickening.  Rule out autoimmune gastritis, pernicious anemia.  Also need to rule out GI bleeding source.  2.  Thrombocytopenia.  3.  B12 deficiency.  4.  Mildly elevated liver enzymes.  CT A/P with mild periportal edema and mild gallbladder wall thickening.  May reflect cholecystitis but patient currently asymptomatic.  5.  HTN.    Recs:  - Monitor CBC, transfuse to Hb >7.  - Follow up antiparietal antibody, intrinsic factor antibodies (ordered).  - Monitor liver enzymes.  If uptrending, check NM HIDA scan to evaluate for cholecystitis.  - Monitor for overt bleeding.  - Continue B12 supplementation.  - EGD and colonoscopy once medically optimized

## 2022-02-27 NOTE — CONSULT NOTE ADULT - SUBJECTIVE AND OBJECTIVE BOX
Patient is a 71y old  Female who presents with a chief complaint of anemia    HPI:  71 Female h/o HTN sent to ER by her PCP for abnormal labs with Hb in 4s.  The patient reports that she went to see her PCP for weakness in her knees and a pulsatile sensation in her head.  She has otherwise been feeling stable and denies any dysphagia, nausea, abdominal pain, hematochezia, melena, weight loss or family history of GI malignancies.  She denies any NSAID use.  She has seen some blood when she brushes her teeth but otherwise denies abnormal bleeding or bruising.  She had a prior colonoscopy many years ago.  In the ER, patient noted to have Hb of 4.3 with platelets of 38.  Hb in 2020 13.1.      PAST MEDICAL & SURGICAL HISTORY:  Hypertension  Denies surgeries      Allergies  Vasotec (Swelling)      MEDICATIONS  (STANDING):  metoprolol succinate ER 25 milliGRAM(s) Oral daily  pantoprazole    Tablet 40 milliGRAM(s) Oral before breakfast    MEDICATIONS  (PRN):  acetaminophen     Tablet .. 650 milliGRAM(s) Oral every 6 hours PRN Temp greater or equal to 38C (100.4F)      FAMILY HISTORY:  Denies family history of GI malignancies or anemia in first degree relatives    Social History:  Denies smoking  Drinks wine "sometimes"      Review of Systems:  Pertinent ROS as per HPI, otherwise negative  General:  No wt loss, fevers, chills, night sweats, +fatigue,   CV:  No pain, palpitations, hypo/hypertension  Resp:  No dyspnea, cough, tachypnea, wheezing  GI:  as per HPI  :  No pain, bleeding, incontinence, nocturia  Muscle:  No pain, weakness  Neuro:  No weakness, tingling, memory problems  Psych:  No fatigue, insomnia, mood problems, depression  Endocrine:  No polyuria, polydypsia, cold/heat intolerance  Heme:  No petechiae, ecchymosis, easy bruisability  Skin:  No rash, tattoos, scars, edema      Vital Signs Last 24 Hrs  T(C): 37.9 (26 Feb 2022 13:29), Max: 37.9 (26 Feb 2022 13:29)  T(F): 100.2 (26 Feb 2022 13:29), Max: 100.2 (26 Feb 2022 13:29)  HR: 66 (26 Feb 2022 13:29) (61 - 106)  BP: 108/42 (26 Feb 2022 13:29) (108/42 - 133/61)  BP(mean): --  RR: 17 (26 Feb 2022 13:29) (16 - 20)  SpO2: 100% (26 Feb 2022 13:29) (98% - 100%)      PHYSICAL EXAM:  Constitutional: NAD, well-developed  HEENT: anicteric, EOMI  Neck: No LAD, supple  Cardiovascular: S1 and S2, RRR, no M  Respiratory: CTA and P  Gastrointestinal: BS+, soft, NT/ND, neg HSM,  Extremities: No peripheral edema, neg clubbing, cyanosis  Vascular: 2+ peripheral pulses  Neurological: A/O x 3, no focal deficits  Psychiatric: Normal mood, normal affect  Skin: No rashes, normal turgor      LABS:                        4.2    4.86  )-----------( 35       ( 26 Feb 2022 11:38 )             13.4     02-26    137  |  106  |  19  ----------------------------<  121<H>  4.3   |  23  |  0.94    Ca    8.9      26 Feb 2022 09:42    TPro  6.9  /  Alb  3.6  /  TBili  2.1<H>  /  DBili  x   /  AST  166<H>  /  ALT  79<H>  /  AlkPhos  56  02-26    PT/INR - ( 26 Feb 2022 09:42 )   PT: 15.0 sec;   INR: 1.26 ratio             LIVER FUNCTIONS - ( 26 Feb 2022 09:42 )  Alb: 3.6 g/dL / Pro: 6.9 gm/dL / ALK PHOS: 56 U/L / ALT: 79 U/L / AST: 166 U/L / GGT: x             RADIOLOGY & ADDITIONAL TESTS:  
Reason for Consultation: Anemia    HPI: Patient is a 71y Female seen on consultatioin for the evaluation and management of Anemia    71 Female h/o HTN sent to ER by her PCP for abnormal labs with Hb in 4s.  The patient reports that she went to see her PCP for weakness in her knees and a pulsatile sensation in her head.  She has otherwise been feeling stable and denies any dysphagia, nausea, abdominal pain, hematochezia, melena, weight loss or family history of GI malignancies.  She denies any NSAID use.  She has seen some blood when she brushes her teeth but otherwise denies abnormal bleeding or bruising.  She had a prior colonoscopy many years ago.  In the ER, patient noted to have Hb of 4.3 with platelets of 38.  Hb in 2020 13.1    Patient states not feeling well for a few months prior to recent blood work, patient was seen yesterday, and blood work ordered, Peripheral smear reviewed yesterday, results of b12 not back yesterday as of 5:30pm    PAST MEDICAL & SURGICAL HISTORY:  Hypertension      MEDICATIONS  (STANDING):  cyanocobalamin Injectable 1000 MICROGram(s) IntraMuscular once  folic acid 1 milliGRAM(s) Oral daily  metoprolol succinate ER 25 milliGRAM(s) Oral daily  pantoprazole    Tablet 40 milliGRAM(s) Oral before breakfast    MEDICATIONS  (PRN):  acetaminophen     Tablet .. 650 milliGRAM(s) Oral every 6 hours PRN Temp greater or equal to 38C (100.4F)      Allergies    Vasotec (Swelling)    Intolerances        SOCIAL HISTORY:    Smoking Status: no  Alcohol: no  Occupation: retired    FAMILY HISTORY:        Vital Signs Last 24 Hrs  T(C): 37 (27 Feb 2022 05:07), Max: 37.9 (26 Feb 2022 13:29)  T(F): 98.6 (27 Feb 2022 05:07), Max: 100.2 (26 Feb 2022 13:29)  HR: 64 (27 Feb 2022 05:07) (58 - 71)  BP: 126/57 (27 Feb 2022 05:07) (103/50 - 146/76)  BP(mean): --  RR: 18 (27 Feb 2022 05:07) (16 - 20)  SpO2: 98% (27 Feb 2022 05:07) (96% - 100%)    PHYSICAL EXAM:    general - AAO x 3  HEENT - No Icterus  CVS - RRR  RS - AE B/L  Abd - soft, NT  Ext - Pulses +        LABS:                        7.3    4.03  )-----------( 28       ( 26 Feb 2022 23:52 )             21.3     02-26    137  |  106  |  19  ----------------------------<  121<H>  4.3   |  23  |  0.94    Ca    8.9      26 Feb 2022 09:42    TPro  6.9  /  Alb  3.6  /  TBili  2.1<H>  /  DBili  x   /  AST  166<H>  /  ALT  79<H>  /  AlkPhos  56  02-26    PT/INR - ( 26 Feb 2022 09:42 )   PT: 15.0 sec;   INR: 1.26 ratio         PTT - ( 26 Feb 2022 23:52 )  PTT:23.1 sec

## 2022-02-27 NOTE — PROGRESS NOTE ADULT - SUBJECTIVE AND OBJECTIVE BOX
CHIEF COMPLAINT/INTERVAL HISTORY:    Patient is a 71y old  Female who presents with a chief complaint of anemia (27 Feb 2022 08:33)      HPI:     70 y/o F     with PMHx of HTN      who presents to the ED, sent by her PMD for abnormal labs.     Pt states she had her blood drawn x3 days ago and received a call last night saying she had a Hg level of 4 and she needed to come to the ED    . Denies CP, palpitations, SOB, syncope, abd pain, N/V/D,  melena/  brbpr,    states "something feels off" and endorses slight fatigue more than usual.     Pt reports hx of colonoscopies in the past.  normal (26 Feb 2022 11:43)    Overnight issues  Patient feeling well  No fever Chills   no nausea , vomiting   No abdominal pain   No chest pain, SOB        SUBJECTIVE & OBJECTIVE: Pt seen and examined at bedside.   ROS: All other ROS negative     T(C): 37.1 (27 Feb 2022 11:23), Max: 37.9 (26 Feb 2022 13:29)  T(F): 98.7 (27 Feb 2022 11:23), Max: 100.2 (26 Feb 2022 13:29)  HR: 79 (27 Feb 2022 11:23) (58 - 79)  BP: 159/71 (27 Feb 2022 11:23) (103/50 - 159/71)  BP(mean): --  ABP: --  ABP(mean): --  RR: 18 (27 Feb 2022 11:23) (16 - 18)  SpO2: 99% (27 Feb 2022 11:23) (96% - 100%)        MEDICATIONS  (STANDING):  folic acid 1 milliGRAM(s) Oral daily  metoprolol succinate ER 25 milliGRAM(s) Oral daily  pantoprazole    Tablet 40 milliGRAM(s) Oral before breakfast    MEDICATIONS  (PRN):  acetaminophen     Tablet .. 650 milliGRAM(s) Oral every 6 hours PRN Temp greater or equal to 38C (100.4F)        PHYSICAL EXAM:    GENERAL: NAD, well-groomed, well-developed  HEAD:  Atraumatic, Normocephalic  EYES: EOMI, PERRLA, conjunctiva and sclera clear  ENMT: Moist mucous membranes  NECK: Supple, No JVD  NERVOUS SYSTEM:  Alert & Oriented X3, Moving all 4 limbs   CHEST/LUNG: Clear to auscultation bilaterally; No rales, rhonchi, wheezing, or rubs  HEART: Regular rate and rhythm; No murmurs, rubs, or gallops  ABDOMEN: Soft, Nontender, Nondistended; Bowel sounds present  EXTREMITIES:  2+ Peripheral Pulses, No clubbing, cyanosis, or edema    LABS:                        8.0    4.20  )-----------( 31       ( 27 Feb 2022 11:42 )             23.5     02-26    137  |  106  |  19  ----------------------------<  121<H>  4.3   |  23  |  0.94    Ca    8.9      26 Feb 2022 09:42    TPro  7.4  /  Alb  4.0  /  TBili  2.8<H>  /  DBili  0.5<H>  /  AST  150<H>  /  ALT  84<H>  /  AlkPhos  67  02-27    PT/INR - ( 27 Feb 2022 08:17 )   PT: 13.7 sec;   INR: 1.15 ratio         PTT - ( 27 Feb 2022 08:17 )  PTT:23.8 sec      CAPILLARY BLOOD GLUCOSE          RECENT CULTURES:      RADIOLOGY & ADDITIONAL TESTS:  Imaging Personally Reviewed:  [ ] YES      Consultant(s) Notes Reviewed:  [ ] YES     Care Discussed with [ ] Consultants [X ] Patient [ ] Family  [ ]    [x ]  Other; RN  HEALTH ISSUES - PROBLEM Dx:  Anemia due to vitamin B12 deficiency          DVT/GI ppx  Discussed with pt @ bedside

## 2022-02-27 NOTE — PROGRESS NOTE ADULT - SUBJECTIVE AND OBJECTIVE BOX
Chief Complaint:  Patient is a 71y old  Female who presents with a chief complaint of anemia (2022 12:33)      Interval Events:   Patient feeling better, denies any bleeding, tolerating diet, denies abdominal pain  status post 2 units PRBCs, Hb 4.2-->8.0 today  B12 <150, status post B12 IM.  Patient denies any recent dietary changes.  CT A/P with mild gastric thickening    Allergies:  Vasotec (Swelling)      Hospital Medications:  acetaminophen     Tablet .. 650 milliGRAM(s) Oral every 6 hours PRN  folic acid 1 milliGRAM(s) Oral daily  metoprolol succinate ER 25 milliGRAM(s) Oral daily  pantoprazole    Tablet 40 milliGRAM(s) Oral before breakfast      PMHX/PSHX:  Hypertension          ROS:   General:  No fevers, chills  Eyes:  Good vision  ENT:  No odynophagia, dysphagia  CV:  No pain, palpitations  Resp:  No dyspnea, cough, tachypnea, wheezing  GI:  See HPI  Muscle:  No pain, weakness  Skin:  No rash, edema    Vital Signs:  Vital Signs Last 24 Hrs  T(C): 37.1 (2022 11:23), Max: 37.3 (2022 16:42)  T(F): 98.7 (2022 11:23), Max: 99.1 (2022 16:42)  HR: 79 (2022 11:23) (58 - 79)  BP: 159/71 (2022 11:23) (103/50 - 159/71)  BP(mean): --  RR: 18 (2022 11:23) (16 - 18)  SpO2: 99% (2022 11:23) (96% - 100%)  Daily     Daily Weight in k.4 (2022 05:07)    PHYSICAL EXAM:   GENERAL:  No distress  HEENT: conjunctivae clear  CHEST:  Full & symmetric excursion, no increased effort  HEART:  Regular rhythm  ABDOMEN:  Soft, non-tender, non-distended  EXTREMITIES:  no edema  SKIN:  Dry/warm  NEURO:  Alert      LABS:                        8.0    4.20  )-----------( 31       ( 2022 11:42 )             23.5     02-    137  |  106  |  19  ----------------------------<  121<H>  4.3   |  23  |  0.94    Ca    8.9      2022 09:42    TPro  7.4  /  Alb  4.0  /  TBili  2.8<H>  /  DBili  0.5<H>  /  AST  150<H>  /  ALT  84<H>  /  AlkPhos  67  02    LIVER FUNCTIONS - ( 2022 08:17 )  Alb: 4.0 g/dL / Pro: 7.4 gm/dL / ALK PHOS: 67 U/L / ALT: 84 U/L / AST: 150 U/L / GGT: x           PT/INR - ( 2022 08:17 )   PT: 13.7 sec;   INR: 1.15 ratio         PTT - ( 2022 08:17 )  PTT:23.8 sec        Imaging:  < from: CT Abdomen and Pelvis w/ IV Cont (22 @ 14:21) >  IMPRESSION:  Mild gastric wall thickening, may reflect gastritis. Consider correlation   with upper endoscopy.    Nonspecific periportal edema and minimal gallbladder wall thickening.   Suggest correlation with liver function tests.          --- End of Report ---            JEFFERY CLARKE MD; Attending Radiologist  This document has been electronically signed. 2022  3:57PM    < end of copied text >

## 2022-02-27 NOTE — CONSULT NOTE ADULT - PROBLEM SELECTOR RECOMMENDATION 9
- patient with severe vitamin b12 deficecny, can mask multiple disorders including severe pancytopenia, intramedullary hemolysis - cause of high LDH and low haptoglobin, peripheral smear reviewed yesterday rare schistocytes, Macrocytosis, thrombocytopenia, no blasts seen  - Parentral b12 ordered daily - needs to be Parenteral  - anti-intrinsic and antiparietal cell antibody   - Peiripheral blood flow cytomtery and [id number] levels ordered  - will need GI work-up when blood counts get better - thickening of stomach seen on CT scan  - daily CBC, hepatic function panel, LDH, retic count - patient with severe vitamin b12 deficecny, can mask multiple disorders including severe pancytopenia, intramedullary hemolysis - cause of high LDH and low haptoglobin, peripheral smear reviewed yesterday rare schistocytes, Macrocytosis, thrombocytopenia, no blasts seen  - Parentral b12 ordered daily - needs to be Parenteral  - anti-intrinsic and antiparietal cell antibody   - Peiripheral blood flow cytomtery and [id number] levels ordered  - will need GI work-up when blood counts get better - thickening of stomach seen on CT scan  - daily CBC, hepatic function panel, LDH, retic count  - if blood counts do not improve will need BM biopsy  - low retic count - not suggestive of MAHA

## 2022-02-27 NOTE — PROGRESS NOTE ADULT - ASSESSMENT
72 y/o F     with PMHx of HTN     who presents to the ED, sent by her PMD for abnormal labs.     Pt states she had her blood drawn x3 days ago and received a call last night saying she had a Hg level of 4 and she needed to come to the ED   . Denies CP, palpitations, SOB, syncope, abd pain, N/V/D,  melena/  brbpr,    states "something feels off" and endorses slight fatigue more than usual.     Pt reports hx of colonoscopies in the past.  normal         * admitted   with anemia  and  fatigue  with conjunctival  pallor   denies  any melena/  brbpr   guaiac is  negative   in er   hb  was  4 on arrival, plt count of  38,000   prbc's/  follow  serial  hb. 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  check B 12/  folate   may  bone  marrow  bx  *  elevated lfts   CT a/p,  ordered    * HTN.  sbp  is  119    toprol dose lowered   Ct a/p,- Mild gastric wall thickening, may reflect gastritis. Consider correlation   with upper endoscopy.    Nonspecific periportal edema and minimal gallbladder wall thickening.   Suggest correlation with liver function tests.   *  on dvt  ppx/   SCDs

## 2022-02-27 NOTE — CONSULT NOTE ADULT - ASSESSMENT
Anemia
71 Female sent to ER for symptomatic anemia.    1.  Anemia, macrocytic.  No overt or occult bleeding noted.  May have underlying GI neoplasm vs malabsorption but severe thrombocytopenia suggests hematologic process.  2.  Thrombocytopenia.  3.  Mildly elevated liver enzymes.  4.  HTN.    Recs:  - Monitor CBC, transfuse to Hb >7.  - Monitor liver enzymes.  - Monitor for overt bleeding.  - Follow up iron studies, B12, folate.  - Follow up CT A/P.  - PPI daily.  - Hematology evaluation.  - May benefit from EGD and colonoscopy once medically optimized if no other etiology for anemia found.

## 2022-02-28 LAB
ALBUMIN SERPL ELPH-MCNC: 4 G/DL — SIGNIFICANT CHANGE UP (ref 3.3–5)
ALP SERPL-CCNC: 64 U/L — SIGNIFICANT CHANGE UP (ref 40–120)
ALT FLD-CCNC: 79 U/L — HIGH (ref 12–78)
ANION GAP SERPL CALC-SCNC: 5 MMOL/L — SIGNIFICANT CHANGE UP (ref 5–17)
ANISOCYTOSIS BLD QL: SLIGHT — SIGNIFICANT CHANGE UP
AST SERPL-CCNC: 156 U/L — HIGH (ref 15–37)
BASOPHILS # BLD AUTO: 0 K/UL — SIGNIFICANT CHANGE UP (ref 0–0.2)
BASOPHILS NFR BLD AUTO: 0 % — SIGNIFICANT CHANGE UP (ref 0–2)
BILIRUB DIRECT SERPL-MCNC: 0.5 MG/DL — HIGH (ref 0–0.3)
BILIRUB INDIRECT FLD-MCNC: 2 MG/DL — HIGH (ref 0.2–1)
BILIRUB SERPL-MCNC: 2.5 MG/DL — HIGH (ref 0.2–1.2)
BUN SERPL-MCNC: 16 MG/DL — SIGNIFICANT CHANGE UP (ref 7–23)
CALCIUM SERPL-MCNC: 8.9 MG/DL — SIGNIFICANT CHANGE UP (ref 8.5–10.1)
CHLORIDE SERPL-SCNC: 110 MMOL/L — HIGH (ref 96–108)
CO2 SERPL-SCNC: 26 MMOL/L — SIGNIFICANT CHANGE UP (ref 22–31)
CREAT SERPL-MCNC: 0.82 MG/DL — SIGNIFICANT CHANGE UP (ref 0.5–1.3)
ELLIPTOCYTES BLD QL SMEAR: SLIGHT — SIGNIFICANT CHANGE UP
EOSINOPHIL # BLD AUTO: 0.13 K/UL — SIGNIFICANT CHANGE UP (ref 0–0.5)
EOSINOPHIL NFR BLD AUTO: 3 % — SIGNIFICANT CHANGE UP (ref 0–6)
GLUCOSE SERPL-MCNC: 104 MG/DL — HIGH (ref 70–99)
HCT VFR BLD CALC: 24.5 % — LOW (ref 34.5–45)
HGB BLD-MCNC: 8.3 G/DL — LOW (ref 11.5–15.5)
HYPOCHROMIA BLD QL: SLIGHT — SIGNIFICANT CHANGE UP
LDH SERPL L TO P-CCNC: 5848 U/L — HIGH (ref 50–242)
LYMPHOCYTES # BLD AUTO: 2.08 K/UL — SIGNIFICANT CHANGE UP (ref 1–3.3)
LYMPHOCYTES # BLD AUTO: 49 % — HIGH (ref 13–44)
MANUAL SMEAR VERIFICATION: SIGNIFICANT CHANGE UP
MCHC RBC-ENTMCNC: 31.2 PG — SIGNIFICANT CHANGE UP (ref 27–34)
MCHC RBC-ENTMCNC: 33.9 G/DL — SIGNIFICANT CHANGE UP (ref 32–36)
MCV RBC AUTO: 92.1 FL — SIGNIFICANT CHANGE UP (ref 80–100)
MICROCYTES BLD QL: SLIGHT — SIGNIFICANT CHANGE UP
MONOCYTES # BLD AUTO: 0.17 K/UL — SIGNIFICANT CHANGE UP (ref 0–0.9)
MONOCYTES NFR BLD AUTO: 4 % — SIGNIFICANT CHANGE UP (ref 2–14)
NEUTROPHILS # BLD AUTO: 1.87 K/UL — SIGNIFICANT CHANGE UP (ref 1.8–7.4)
NEUTROPHILS NFR BLD AUTO: 44 % — SIGNIFICANT CHANGE UP (ref 43–77)
NRBC # BLD: 2 /100 — HIGH (ref 0–0)
NRBC # BLD: SIGNIFICANT CHANGE UP /100 WBCS (ref 0–0)
OVALOCYTES BLD QL SMEAR: SLIGHT — SIGNIFICANT CHANGE UP
PLAT MORPH BLD: NORMAL — SIGNIFICANT CHANGE UP
PLATELET # BLD AUTO: 36 K/UL — LOW (ref 150–400)
POLYCHROMASIA BLD QL SMEAR: SLIGHT — SIGNIFICANT CHANGE UP
POTASSIUM SERPL-MCNC: 3.6 MMOL/L — SIGNIFICANT CHANGE UP (ref 3.5–5.3)
POTASSIUM SERPL-SCNC: 3.6 MMOL/L — SIGNIFICANT CHANGE UP (ref 3.5–5.3)
PROT SERPL-MCNC: 7.3 GM/DL — SIGNIFICANT CHANGE UP (ref 6–8.3)
RBC # BLD: 2.66 M/UL — LOW (ref 3.8–5.2)
RBC # BLD: 2.66 M/UL — LOW (ref 3.8–5.2)
RBC # FLD: 24.6 % — HIGH (ref 10.3–14.5)
RBC BLD AUTO: ABNORMAL
RETICS #: 35.2 K/UL — SIGNIFICANT CHANGE UP (ref 25–125)
RETICS/RBC NFR: 1.3 % — SIGNIFICANT CHANGE UP (ref 0.5–2.5)
SCHISTOCYTES BLD QL AUTO: SLIGHT — SIGNIFICANT CHANGE UP
SODIUM SERPL-SCNC: 141 MMOL/L — SIGNIFICANT CHANGE UP (ref 135–145)
WBC # BLD: 4.24 K/UL — SIGNIFICANT CHANGE UP (ref 3.8–10.5)
WBC # FLD AUTO: 4.24 K/UL — SIGNIFICANT CHANGE UP (ref 3.8–10.5)

## 2022-02-28 PROCEDURE — 99233 SBSQ HOSP IP/OBS HIGH 50: CPT

## 2022-02-28 RX ORDER — SODIUM CHLORIDE 9 MG/ML
1000 INJECTION INTRAMUSCULAR; INTRAVENOUS; SUBCUTANEOUS
Refills: 0 | Status: DISCONTINUED | OUTPATIENT
Start: 2022-02-28 | End: 2022-03-04

## 2022-02-28 RX ADMIN — Medication 25 MILLIGRAM(S): at 06:30

## 2022-02-28 RX ADMIN — PREGABALIN 1000 MICROGRAM(S): 225 CAPSULE ORAL at 10:02

## 2022-02-28 RX ADMIN — SODIUM CHLORIDE 50 MILLILITER(S): 9 INJECTION INTRAMUSCULAR; INTRAVENOUS; SUBCUTANEOUS at 16:12

## 2022-02-28 RX ADMIN — PANTOPRAZOLE SODIUM 40 MILLIGRAM(S): 20 TABLET, DELAYED RELEASE ORAL at 10:01

## 2022-02-28 RX ADMIN — Medication 1 MILLIGRAM(S): at 12:13

## 2022-02-28 NOTE — PROGRESS NOTE ADULT - SUBJECTIVE AND OBJECTIVE BOX
Pt stable - feels better  Being seen by hematology      MEDICATIONS  (STANDING):  cyanocobalamin Injectable 1000 MICROGram(s) IntraMuscular every 24 hours  folic acid 1 milliGRAM(s) Oral daily  metoprolol succinate ER 25 milliGRAM(s) Oral daily  pantoprazole    Tablet 40 milliGRAM(s) Oral before breakfast    MEDICATIONS  (PRN):  acetaminophen     Tablet .. 650 milliGRAM(s) Oral every 6 hours PRN Temp greater or equal to 38C (100.4F)      Allergies    Vasotec (Swelling)    Intolerances        Vital Signs Last 24 Hrs  T(C): 37.1 (28 Feb 2022 11:20), Max: 37.1 (28 Feb 2022 11:20)  T(F): 98.8 (28 Feb 2022 11:20), Max: 98.8 (28 Feb 2022 11:20)  HR: 71 (28 Feb 2022 11:20) (63 - 73)  BP: 133/64 (28 Feb 2022 11:20) (108/54 - 149/75)  BP(mean): --  RR: 18 (28 Feb 2022 11:20) (18 - 18)  SpO2: 98% (28 Feb 2022 11:20) (96% - 100%)    PHYSICAL EXAM:  General: NAD.  CVS: S1, S2  Chest: air entry bilaterally present  Abd: BS present, soft, non-tender      LABS:                        8.3    4.24  )-----------( 36       ( 28 Feb 2022 07:32 )             24.5     02-28    141  |  110<H>  |  16  ----------------------------<  104<H>  3.6   |  26  |  0.82    Ca    8.9      28 Feb 2022 07:32    TPro  7.3  /  Alb  4.0  /  TBili  2.5<H>  /  DBili  0.5<H>  /  AST  156<H>  /  ALT  79<H>  /  AlkPhos  64  02-28    PT/INR - ( 27 Feb 2022 08:17 )   PT: 13.7 sec;   INR: 1.15 ratio         PTT - ( 27 Feb 2022 08:17 )  PTT:23.8 sec    follow labs; LFT's  will need EGD/Colon when cleared by Medicine

## 2022-02-28 NOTE — PROGRESS NOTE ADULT - ASSESSMENT
70 y/o F     with PMHx of HTN     who presents to the ED, sent by her PMD for abnormal labs.     Pt states she had her blood drawn x3 days ago and received a call last night saying she had a Hg level of 4 and she needed to come to the ED   . Denies CP, palpitations, SOB, syncope, abd pain, N/V/D,  melena/  brbpr,    states "something feels off" and endorses slight fatigue more than usual.     Pt reports hx of colonoscopies in the past.  normal      Anemia   MDS vs. Myelophthisic   Hematology/ GI consult appreciated   Vitamin B12 Deficiency: cont w/ supplement w/ folic acid   f/u Flow Cytometry, [id number] levels, Antiparietal cell Ab and Anti-Intrinsic factor Antibody  s/p 2U pRBC transfusion, cont to monitor h/h    Thrombocytopenia:   as above   monitor for bleeding  cont monitor platelets consider transfusion if no improvement     Gastric Thickening   Ct a/p,- Mild gastric wall thickening, may reflect gastritis. Consider correlation   with upper endoscopy.  cont w/ protonix   GI on board; EGD/Colonoscopy when stable,     Hypertension   cont w/ metoprolol     dvt ppx/   SCDs    Plan discussed with Spouse Lauren

## 2022-02-28 NOTE — PROGRESS NOTE ADULT - SUBJECTIVE AND OBJECTIVE BOX
feels well    Vital Signs Last 24 Hrs  T(C): 37 (28 Feb 2022 05:51), Max: 37.1 (27 Feb 2022 11:23)  T(F): 98.6 (28 Feb 2022 05:51), Max: 98.7 (27 Feb 2022 11:23)  HR: 67 (28 Feb 2022 05:51) (63 - 79)  BP: 127/68 (28 Feb 2022 05:51) (108/54 - 159/71)  BP(mean): --  RR: 18 (28 Feb 2022 05:51) (18 - 18)  SpO2: 100% (28 Feb 2022 05:51) (96% - 100%)    PHYSICAL EXAM:    general - AAO x 3  HEENT - No Icterus  CVS - RRR  RS - AE B/L  Abd - soft, NT  Ext - Pulses +        LABS:                        8.3    4.24  )-----------( 36       ( 28 Feb 2022 07:32 )             24.5     02-28    141  |  110<H>  |  16  ----------------------------<  104<H>  3.6   |  26  |  0.82    Ca    8.9      28 Feb 2022 07:32    TPro  7.3  /  Alb  4.0  /  TBili  2.5<H>  /  DBili  0.5<H>  /  AST  156<H>  /  ALT  79<H>  /  AlkPhos  64  02-28    PT/INR - ( 27 Feb 2022 08:17 )   PT: 13.7 sec;   INR: 1.15 ratio         PTT - ( 27 Feb 2022 08:17 )  PTT:23.8 sec

## 2022-02-28 NOTE — PROGRESS NOTE ADULT - SUBJECTIVE AND OBJECTIVE BOX
Patient is a 71y old  Female who presents with a chief complaint of anemia (28 Feb 2022 12:06)      INTERVAL HPI/ OVERNIGHT EVENTS: Pt was seen and examined at bedside today, No significant overnight events, pt continues to feel weak, denies any abdominal pain, BRBPR or melena      MEDICATIONS  (STANDING):  cyanocobalamin Injectable 1000 MICROGram(s) IntraMuscular every 24 hours  folic acid 1 milliGRAM(s) Oral daily  metoprolol succinate ER 25 milliGRAM(s) Oral daily  pantoprazole    Tablet 40 milliGRAM(s) Oral before breakfast  sodium chloride 0.9%. 1000 milliLiter(s) (50 mL/Hr) IV Continuous <Continuous>    MEDICATIONS  (PRN):  acetaminophen     Tablet .. 650 milliGRAM(s) Oral every 6 hours PRN Temp greater or equal to 38C (100.4F)      Allergies    Vasotec (Swelling)    Intolerances        REVIEW OF SYSTEMS:    Unable to examine due to [ ] Encephalopathy [ ] Advanced Dementia [ ] Expressive Aphasia [ ] Non-verbal patient    CONSTITUTIONAL: No fever, positive generalized weakness/Fatigue, No weight loss  EYES: No eye pain, visual disturbances, or discharge  ENMT:  No difficulty hearing, tinnitus, vertigo; No sinus or throat pain  NECK: No pain or stiffness  RESPIRATORY: No shortness of breath,  cough, wheezing, sputum or hemoptysis   CARDIOVASCULAR: No chest pain, palpitations, or leg swelling  GASTROINTESTINAL: No abdominal pain. No nausea, vomiting, diarrhea or constipation. No melena or hematochezia.  GENITOURINARY: No dysuria, frequency, hematuria, or incontinence  NEUROLOGICAL: No headaches, Dizziness, memory loss, loss of strength, numbness, or tremors  SKIN: No itching, burning, rashes, or lesions   MUSCULOSKELETAL: No joint pain or swelling; No muscle, back, or extremity pain  PSYCHIATRIC: No depression, anxiety, mood swings, or difficulty sleeping  HEME/LYMPH: No easy bruising, or bleeding gums      Vital Signs Last 24 Hrs  T(C): 37.1 (28 Feb 2022 11:20), Max: 37.1 (28 Feb 2022 11:20)  T(F): 98.8 (28 Feb 2022 11:20), Max: 98.8 (28 Feb 2022 11:20)  HR: 71 (28 Feb 2022 11:20) (63 - 73)  BP: 133/64 (28 Feb 2022 11:20) (108/54 - 149/75)  BP(mean): --  RR: 18 (28 Feb 2022 11:20) (18 - 18)  SpO2: 98% (28 Feb 2022 11:20) (96% - 100%)    PHYSICAL EXAM:  GENERAL: NAD on RA  HEAD:  Atraumatic, Normocephalic  EYES: conjunctiva and sclera clear  ENMT: Moist mucous membranes  NECK: Supple, No JVD, Normal thyroid  CHEST/LUNG: Clear to Auscultation bilaterally; No rales, rhonchi, wheezing, or rubs  HEART: Regular rate and rhythm; No murmurs, rubs, or gallops  ABDOMEN: Soft, Nontender, Nondistended; Bowel sounds present  EXTREMITIES:  2+ Peripheral Pulses, No clubbing, cyanosis, or edema  SKIN: No rashes or lesions  NERVOUS SYSTEM:  Alert & Oriented X3, Good concentration; Motor Strength 5/5 B/L upper and lower extremities    LABS:                        8.3    4.24  )-----------( 36       ( 28 Feb 2022 07:32 )             24.5     02-28    141  |  110<H>  |  16  ----------------------------<  104<H>  3.6   |  26  |  0.82    Ca    8.9      28 Feb 2022 07:32    TPro  7.3  /  Alb  4.0  /  TBili  2.5<H>  /  DBili  0.5<H>  /  AST  156<H>  /  ALT  79<H>  /  AlkPhos  64  02-28    PT/INR - ( 27 Feb 2022 08:17 )   PT: 13.7 sec;   INR: 1.15 ratio         PTT - ( 27 Feb 2022 08:17 )  PTT:23.8 sec    CAPILLARY BLOOD GLUCOSE            Culture - Blood (collected 02-27-22)  Source: .Blood Blood-Peripheral  Preliminary Report (02-28-22):    No growth to date.        RADIOLOGY & ADDITIONAL TESTS:          Imaging Personally Reviewed:  [ ] YES  [ ] NO    Consultant(s) Notes Reviewed:  [ ] YES  [ ] NO    Care Discussed with Consultants/Other Providers [x ] YES  [ ] NO

## 2022-03-01 LAB
ALBUMIN SERPL ELPH-MCNC: 3.1 G/DL — LOW (ref 3.3–5)
ALP SERPL-CCNC: 45 U/L — SIGNIFICANT CHANGE UP (ref 40–120)
ALT FLD-CCNC: 56 U/L — SIGNIFICANT CHANGE UP (ref 12–78)
ANION GAP SERPL CALC-SCNC: 4 MMOL/L — LOW (ref 5–17)
AST SERPL-CCNC: 92 U/L — HIGH (ref 15–37)
BASOPHILS # BLD AUTO: 0.01 K/UL — SIGNIFICANT CHANGE UP (ref 0–0.2)
BASOPHILS NFR BLD AUTO: 0.2 % — SIGNIFICANT CHANGE UP (ref 0–2)
BILIRUB SERPL-MCNC: 1.4 MG/DL — HIGH (ref 0.2–1.2)
BLD GP AB SCN SERPL QL: SIGNIFICANT CHANGE UP
BUN SERPL-MCNC: 11 MG/DL — SIGNIFICANT CHANGE UP (ref 7–23)
CALCIUM SERPL-MCNC: 8.7 MG/DL — SIGNIFICANT CHANGE UP (ref 8.5–10.1)
CHLORIDE SERPL-SCNC: 112 MMOL/L — HIGH (ref 96–108)
CO2 SERPL-SCNC: 26 MMOL/L — SIGNIFICANT CHANGE UP (ref 22–31)
CREAT SERPL-MCNC: 0.6 MG/DL — SIGNIFICANT CHANGE UP (ref 0.5–1.3)
EGFR: 95 ML/MIN/1.73M2 — SIGNIFICANT CHANGE UP
EOSINOPHIL # BLD AUTO: 0.17 K/UL — SIGNIFICANT CHANGE UP (ref 0–0.5)
EOSINOPHIL NFR BLD AUTO: 3.9 % — SIGNIFICANT CHANGE UP (ref 0–6)
GLUCOSE SERPL-MCNC: 86 MG/DL — SIGNIFICANT CHANGE UP (ref 70–99)
HCT VFR BLD CALC: 23 % — LOW (ref 34.5–45)
HGB BLD-MCNC: 7.4 G/DL — LOW (ref 11.5–15.5)
IMM GRANULOCYTES NFR BLD AUTO: 2.1 % — HIGH (ref 0–1.5)
LDH SERPL L TO P-CCNC: 4585 U/L — HIGH (ref 50–242)
LYMPHOCYTES # BLD AUTO: 2.02 K/UL — SIGNIFICANT CHANGE UP (ref 1–3.3)
LYMPHOCYTES # BLD AUTO: 46.3 % — HIGH (ref 13–44)
MCHC RBC-ENTMCNC: 30.1 PG — SIGNIFICANT CHANGE UP (ref 27–34)
MCHC RBC-ENTMCNC: 32.2 G/DL — SIGNIFICANT CHANGE UP (ref 32–36)
MCV RBC AUTO: 93.5 FL — SIGNIFICANT CHANGE UP (ref 80–100)
MONOCYTES # BLD AUTO: 0.52 K/UL — SIGNIFICANT CHANGE UP (ref 0–0.9)
MONOCYTES NFR BLD AUTO: 11.9 % — SIGNIFICANT CHANGE UP (ref 2–14)
NEUTROPHILS # BLD AUTO: 1.55 K/UL — LOW (ref 1.8–7.4)
NEUTROPHILS NFR BLD AUTO: 35.6 % — LOW (ref 43–77)
NRBC # BLD: 6 /100 WBCS — HIGH (ref 0–0)
PCA AB SER-ACNC: SIGNIFICANT CHANGE UP
PLATELET # BLD AUTO: 32 K/UL — LOW (ref 150–400)
POTASSIUM SERPL-MCNC: 3.9 MMOL/L — SIGNIFICANT CHANGE UP (ref 3.5–5.3)
POTASSIUM SERPL-SCNC: 3.9 MMOL/L — SIGNIFICANT CHANGE UP (ref 3.5–5.3)
PROT SERPL-MCNC: 5.9 GM/DL — LOW (ref 6–8.3)
RBC # BLD: 2.46 M/UL — LOW (ref 3.8–5.2)
RBC # BLD: 2.46 M/UL — LOW (ref 3.8–5.2)
RBC # FLD: 24.1 % — HIGH (ref 10.3–14.5)
RETICS #: 29.4 K/UL — SIGNIFICANT CHANGE UP (ref 25–125)
RETICS/RBC NFR: 1.3 % — SIGNIFICANT CHANGE UP (ref 0.5–2.5)
SODIUM SERPL-SCNC: 142 MMOL/L — SIGNIFICANT CHANGE UP (ref 135–145)
WBC # BLD: 4.36 K/UL — SIGNIFICANT CHANGE UP (ref 3.8–10.5)
WBC # FLD AUTO: 4.36 K/UL — SIGNIFICANT CHANGE UP (ref 3.8–10.5)

## 2022-03-01 PROCEDURE — 99232 SBSQ HOSP IP/OBS MODERATE 35: CPT

## 2022-03-01 RX ADMIN — PANTOPRAZOLE SODIUM 40 MILLIGRAM(S): 20 TABLET, DELAYED RELEASE ORAL at 06:17

## 2022-03-01 RX ADMIN — PREGABALIN 1000 MICROGRAM(S): 225 CAPSULE ORAL at 11:34

## 2022-03-01 RX ADMIN — Medication 1 MILLIGRAM(S): at 12:52

## 2022-03-01 RX ADMIN — Medication 25 MILLIGRAM(S): at 06:17

## 2022-03-01 RX ADMIN — SODIUM CHLORIDE 50 MILLILITER(S): 9 INJECTION INTRAMUSCULAR; INTRAVENOUS; SUBCUTANEOUS at 08:49

## 2022-03-01 NOTE — PROGRESS NOTE ADULT - ASSESSMENT
70 y/o F     with PMHx of HTN     who presents to the ED, sent by her PMD for abnormal labs.     Pt states she had her blood drawn x3 days ago and received a call last night saying she had a Hg level of 4 and she needed to come to the ED   . Denies CP, palpitations, SOB, syncope, abd pain, N/V/D,  melena/  brbpr,    states "something feels off" and endorses slight fatigue more than usual.     Pt reports hx of colonoscopies in the past.  normal      Anemia   MDS vs. Myelophthisic   Hematology/ GI consult appreciated   Vitamin B12 Deficiency: cont w/ supplement w/ folic acid   f/u Flow Cytometry, [id number] levels, Antiparietal cell Ab and Anti-Intrinsic factor Antibody  s/p 2U pRBC transfusion, cont to monitor CBC  as per hematology will monitor for now    Thrombocytopenia:   as above   monitor for bleeding  cont monitor platelets consider transfusion if no improvement     Gastric Thickening   Ct a/p,- Mild gastric wall thickening, may reflect gastritis. Consider correlation   with upper endoscopy.  cont w/ protonix   GI on board; eventual EGD/Colonoscopy when blood lines are stable     Hypertension   cont w/ metoprolol     dvt ppx/   SCDs

## 2022-03-01 NOTE — PROGRESS NOTE ADULT - SUBJECTIVE AND OBJECTIVE BOX
Pt with drop in H/H - also thrombocytopenic  f/b hematology  Awaiting decision regarding EGD/Colon     MEDICATIONS  (STANDING):  cyanocobalamin Injectable 1000 MICROGram(s) IntraMuscular every 24 hours  folic acid 1 milliGRAM(s) Oral daily  metoprolol succinate ER 25 milliGRAM(s) Oral daily  pantoprazole    Tablet 40 milliGRAM(s) Oral before breakfast  sodium chloride 0.9%. 1000 milliLiter(s) (50 mL/Hr) IV Continuous <Continuous>    MEDICATIONS  (PRN):  acetaminophen     Tablet .. 650 milliGRAM(s) Oral every 6 hours PRN Temp greater or equal to 38C (100.4F)      Allergies    Vasotec (Swelling)    Intolerances        Vital Signs Last 24 Hrs  T(C): 36.9 (01 Mar 2022 05:16), Max: 37.1 (28 Feb 2022 11:20)  T(F): 98.4 (01 Mar 2022 05:16), Max: 98.8 (28 Feb 2022 11:20)  HR: 64 (01 Mar 2022 05:16) (58 - 75)  BP: 133/64 (01 Mar 2022 05:16) (127/72 - 148/75)  BP(mean): --  RR: 16 (01 Mar 2022 05:16) (16 - 18)  SpO2: 99% (01 Mar 2022 05:16) (98% - 100%)    PHYSICAL EXAM:  General: NAD.  CVS: S1, S2  Chest: air entry bilaterally present  Abd: BS present, soft, non-tender      LABS:                        7.4    4.36  )-----------( 32       ( 01 Mar 2022 06:37 )             23.0     03-01    142  |  112<H>  |  11  ----------------------------<  86  3.9   |  26  |  0.60    Ca    8.7      01 Mar 2022 06:37    TPro  5.9<L>  /  Alb  3.1<L>  /  TBili  1.4<H>  /  DBili  x   /  AST  92<H>  /  ALT  56  /  AlkPhos  45  03-01        continue as per Hematology  Discussed with Dr Spain - will arrange EGD/Colon when ok with Hematology - might need to be transfused pre-procedure

## 2022-03-01 NOTE — PROGRESS NOTE ADULT - SUBJECTIVE AND OBJECTIVE BOX
Patient is a 71y old  Female who presents with a chief complaint of anemia (01 Mar 2022 10:03)      INTERVAL HPI/ OVERNIGHT EVENTS: Pt was seen and examined at bedside today, No significant overnight events, pt admits to some cold intolerance otherwise denies any new complaints.      MEDICATIONS  (STANDING):  cyanocobalamin Injectable 1000 MICROGram(s) IntraMuscular every 24 hours  folic acid 1 milliGRAM(s) Oral daily  metoprolol succinate ER 25 milliGRAM(s) Oral daily  pantoprazole    Tablet 40 milliGRAM(s) Oral before breakfast  sodium chloride 0.9%. 1000 milliLiter(s) (50 mL/Hr) IV Continuous <Continuous>    MEDICATIONS  (PRN):  acetaminophen     Tablet .. 650 milliGRAM(s) Oral every 6 hours PRN Temp greater or equal to 38C (100.4F)      Allergies    Vasotec (Swelling)    Intolerances        REVIEW OF SYSTEMS:    Unable to examine due to [ ] Encephalopathy [ ] Advanced Dementia [ ] Expressive Aphasia [ ] Non-verbal patient    CONSTITUTIONAL: No fever, no generalized weakness/Fatigue, No weight loss  EYES: No eye pain, visual disturbances, or discharge  ENMT:  No difficulty hearing, tinnitus, vertigo; No sinus or throat pain  NECK: No pain or stiffness  RESPIRATORY: No shortness of breath,  cough, wheezing, sputum or hemoptysis   CARDIOVASCULAR: No chest pain, palpitations, or leg swelling  GASTROINTESTINAL: No abdominal pain. No nausea, vomiting, diarrhea or constipation. No melena or hematochezia.  GENITOURINARY: No dysuria, frequency, hematuria, or incontinence  NEUROLOGICAL: No headaches, Dizziness, memory loss, loss of strength, numbness, or tremors  SKIN: No itching, burning, rashes, or lesions   MUSCULOSKELETAL: No joint pain or swelling; No muscle, back, or extremity pain  PSYCHIATRIC: No depression, anxiety, mood swings, or difficulty sleeping  HEME/LYMPH: No easy bruising, or bleeding gums      Vital Signs Last 24 Hrs  T(C): 36.8 (01 Mar 2022 10:54), Max: 36.9 (01 Mar 2022 05:16)  T(F): 98.2 (01 Mar 2022 10:54), Max: 98.4 (01 Mar 2022 05:16)  HR: 71 (01 Mar 2022 10:54) (58 - 75)  BP: 130/78 (01 Mar 2022 10:54) (127/72 - 148/75)  BP(mean): --  RR: 16 (01 Mar 2022 10:54) (16 - 18)  SpO2: 97% (01 Mar 2022 10:54) (97% - 100%)    PHYSICAL EXAM:  GENERAL: NAD on RA  HEAD:  Atraumatic, Normocephalic  EYES: conjunctiva and sclera clear  ENMT: Moist mucous membranes  NECK: Supple, No JVD, Normal thyroid  CHEST/LUNG: Clear to Auscultation bilaterally; No rales, rhonchi, wheezing, or rubs  HEART: Regular rate and rhythm; No murmurs, rubs, or gallops  ABDOMEN: Soft, Nontender, Nondistended; Bowel sounds present  EXTREMITIES:  2+ Peripheral Pulses, No clubbing, cyanosis, or edema  SKIN: No rashes or lesions  NERVOUS SYSTEM:  Alert & Oriented X3, Good concentration; Motor Strength 5/5 B/L upper and lower extremities    LABS:                        7.4    4.36  )-----------( 32       ( 01 Mar 2022 06:37 )             23.0     03-01    142  |  112<H>  |  11  ----------------------------<  86  3.9   |  26  |  0.60    Ca    8.7      01 Mar 2022 06:37    TPro  5.9<L>  /  Alb  3.1<L>  /  TBili  1.4<H>  /  DBili  x   /  AST  92<H>  /  ALT  56  /  AlkPhos  45  03-01        CAPILLARY BLOOD GLUCOSE            Culture - Blood (collected 02-27-22)  Source: .Blood Blood-Peripheral  Preliminary Report (02-28-22):    No growth to date.        RADIOLOGY & ADDITIONAL TESTS:          Imaging Personally Reviewed:  [ ] YES  [ ] NO    Consultant(s) Notes Reviewed:  [ ] YES  [ ] NO    Care Discussed with Consultants/Other Providers [x ] YES  [ ] NO

## 2022-03-01 NOTE — PROGRESS NOTE ADULT - SUBJECTIVE AND OBJECTIVE BOX
patient feels well    Vital Signs Last 24 Hrs  T(C): 36.9 (01 Mar 2022 05:16), Max: 37.1 (28 Feb 2022 11:20)  T(F): 98.4 (01 Mar 2022 05:16), Max: 98.8 (28 Feb 2022 11:20)  HR: 64 (01 Mar 2022 05:16) (58 - 75)  BP: 133/64 (01 Mar 2022 05:16) (127/72 - 148/75)  BP(mean): --  RR: 16 (01 Mar 2022 05:16) (16 - 18)  SpO2: 99% (01 Mar 2022 05:16) (98% - 100%)    PHYSICAL EXAM:    general - AAO x 3  HEENT - No Icterus  CVS - RRR  RS - AE B/L  Abd - soft, NT  Ext - Pulses +        LABS:                        7.4    4.36  )-----------( 32       ( 01 Mar 2022 06:37 )             23.0     03-01    142  |  112<H>  |  11  ----------------------------<  86  3.9   |  26  |  0.60    Ca    8.7      01 Mar 2022 06:37    TPro  5.9<L>  /  Alb  3.1<L>  /  TBili  1.4<H>  /  DBili  x   /  AST  92<H>  /  ALT  56  /  AlkPhos  45  03-01          Culture - Blood (collected 27 Feb 2022 11:43)  Source: .Blood Blood-Peripheral  Preliminary Report (28 Feb 2022 12:01):    No growth to date.

## 2022-03-02 LAB
ADAMTS13 ACTIVITY: 60.8 % — LOW
ADAMTS13-COMMENT: SIGNIFICANT CHANGE UP
ALBUMIN SERPL ELPH-MCNC: 3.6 G/DL — SIGNIFICANT CHANGE UP (ref 3.3–5)
ALP SERPL-CCNC: 51 U/L — SIGNIFICANT CHANGE UP (ref 40–120)
ALT FLD-CCNC: 49 U/L — SIGNIFICANT CHANGE UP (ref 12–78)
ANION GAP SERPL CALC-SCNC: 7 MMOL/L — SIGNIFICANT CHANGE UP (ref 5–17)
AST SERPL-CCNC: 62 U/L — HIGH (ref 15–37)
BASOPHILS # BLD AUTO: 0.01 K/UL — SIGNIFICANT CHANGE UP (ref 0–0.2)
BASOPHILS NFR BLD AUTO: 0.2 % — SIGNIFICANT CHANGE UP (ref 0–2)
BILIRUB SERPL-MCNC: 1.5 MG/DL — HIGH (ref 0.2–1.2)
BUN SERPL-MCNC: 10 MG/DL — SIGNIFICANT CHANGE UP (ref 7–23)
CALCIUM SERPL-MCNC: 8.9 MG/DL — SIGNIFICANT CHANGE UP (ref 8.5–10.1)
CHLORIDE SERPL-SCNC: 111 MMOL/L — HIGH (ref 96–108)
CO2 SERPL-SCNC: 25 MMOL/L — SIGNIFICANT CHANGE UP (ref 22–31)
CREAT SERPL-MCNC: 0.67 MG/DL — SIGNIFICANT CHANGE UP (ref 0.5–1.3)
EGFR: 92 ML/MIN/1.73M2 — SIGNIFICANT CHANGE UP
EOSINOPHIL # BLD AUTO: 0.19 K/UL — SIGNIFICANT CHANGE UP (ref 0–0.5)
EOSINOPHIL NFR BLD AUTO: 3.7 % — SIGNIFICANT CHANGE UP (ref 0–6)
GLUCOSE SERPL-MCNC: 94 MG/DL — SIGNIFICANT CHANGE UP (ref 70–99)
HCT VFR BLD CALC: 24.6 % — LOW (ref 34.5–45)
HGB BLD-MCNC: 7.9 G/DL — LOW (ref 11.5–15.5)
IF BLOCK AB SER-ACNC: 143.7 AU/ML — HIGH (ref 0–1.1)
IMM GRANULOCYTES NFR BLD AUTO: 5.2 % — HIGH (ref 0–1.5)
LDH SERPL L TO P-CCNC: 4421 U/L — HIGH (ref 50–242)
LYMPHOCYTES # BLD AUTO: 2.04 K/UL — SIGNIFICANT CHANGE UP (ref 1–3.3)
LYMPHOCYTES # BLD AUTO: 39.4 % — SIGNIFICANT CHANGE UP (ref 13–44)
MCHC RBC-ENTMCNC: 31 PG — SIGNIFICANT CHANGE UP (ref 27–34)
MCHC RBC-ENTMCNC: 32.1 G/DL — SIGNIFICANT CHANGE UP (ref 32–36)
MCV RBC AUTO: 96.5 FL — SIGNIFICANT CHANGE UP (ref 80–100)
MONOCYTES # BLD AUTO: 0.6 K/UL — SIGNIFICANT CHANGE UP (ref 0–0.9)
MONOCYTES NFR BLD AUTO: 11.6 % — SIGNIFICANT CHANGE UP (ref 2–14)
NEUTROPHILS # BLD AUTO: 2.07 K/UL — SIGNIFICANT CHANGE UP (ref 1.8–7.4)
NEUTROPHILS NFR BLD AUTO: 39.9 % — LOW (ref 43–77)
NRBC # BLD: 16 /100 WBCS — HIGH (ref 0–0)
PLATELET # BLD AUTO: 39 K/UL — LOW (ref 150–400)
POTASSIUM SERPL-MCNC: 3.2 MMOL/L — LOW (ref 3.5–5.3)
POTASSIUM SERPL-SCNC: 3.2 MMOL/L — LOW (ref 3.5–5.3)
PROT SERPL-MCNC: 6.9 GM/DL — SIGNIFICANT CHANGE UP (ref 6–8.3)
RBC # BLD: 2.55 M/UL — LOW (ref 3.8–5.2)
RBC # BLD: 2.55 M/UL — LOW (ref 3.8–5.2)
RBC # FLD: 24.4 % — HIGH (ref 10.3–14.5)
RETICS #: 65.4 K/UL — SIGNIFICANT CHANGE UP (ref 25–125)
RETICS/RBC NFR: 2.5 % — SIGNIFICANT CHANGE UP (ref 0.5–2.5)
SODIUM SERPL-SCNC: 143 MMOL/L — SIGNIFICANT CHANGE UP (ref 135–145)
WBC # BLD: 5.18 K/UL — SIGNIFICANT CHANGE UP (ref 3.8–10.5)
WBC # FLD AUTO: 5.18 K/UL — SIGNIFICANT CHANGE UP (ref 3.8–10.5)

## 2022-03-02 PROCEDURE — 99232 SBSQ HOSP IP/OBS MODERATE 35: CPT

## 2022-03-02 RX ORDER — AMLODIPINE BESYLATE 2.5 MG/1
2.5 TABLET ORAL DAILY
Refills: 0 | Status: DISCONTINUED | OUTPATIENT
Start: 2022-03-02 | End: 2022-03-04

## 2022-03-02 RX ORDER — POTASSIUM CHLORIDE 20 MEQ
40 PACKET (EA) ORAL EVERY 4 HOURS
Refills: 0 | Status: COMPLETED | OUTPATIENT
Start: 2022-03-02 | End: 2022-03-02

## 2022-03-02 RX ADMIN — Medication 25 MILLIGRAM(S): at 05:44

## 2022-03-02 RX ADMIN — SODIUM CHLORIDE 50 MILLILITER(S): 9 INJECTION INTRAMUSCULAR; INTRAVENOUS; SUBCUTANEOUS at 17:52

## 2022-03-02 RX ADMIN — Medication 1 MILLIGRAM(S): at 11:27

## 2022-03-02 RX ADMIN — Medication 40 MILLIEQUIVALENT(S): at 11:27

## 2022-03-02 RX ADMIN — PREGABALIN 1000 MICROGRAM(S): 225 CAPSULE ORAL at 11:28

## 2022-03-02 RX ADMIN — SODIUM CHLORIDE 50 MILLILITER(S): 9 INJECTION INTRAMUSCULAR; INTRAVENOUS; SUBCUTANEOUS at 05:45

## 2022-03-02 RX ADMIN — Medication 40 MILLIEQUIVALENT(S): at 13:36

## 2022-03-02 RX ADMIN — PANTOPRAZOLE SODIUM 40 MILLIGRAM(S): 20 TABLET, DELAYED RELEASE ORAL at 05:44

## 2022-03-02 NOTE — PROGRESS NOTE ADULT - ASSESSMENT
72 y/o F with PMHx of HTN who presents to the ED, sent by her PMD for anemia and was admitted for pancytopenia.     Anemia   MDS vs. Myelophthisic   Hematology/ GI consult appreciated   Vitamin B12 Deficiency: cont w/ supplement w/ folic acid   f/u Flow Cytometry, [id number] levels, Antiparietal cell Ab and Anti-Intrinsic factor Antibody  s/p 2U pRBC transfusion, cont to monitor CBC  as per hematology will monitor for now    Thrombocytopenia:   as above   monitor for bleeding  cont monitor platelets consider transfusion if no improvement     Gastric Thickening   Ct a/p,- Mild gastric wall thickening, may reflect gastritis. Consider correlation   with upper endoscopy.  cont w/ protonix   GI on board; eventual EGD/Colonoscopy when blood lines are stable     Hypertension   cont w/ metoprolol     dvt ppx/   SCDs 70 y/o F with PMHx of HTN who presents to the ED, sent by her PMD for anemia and was admitted for pancytopenia.     Anemia   MDS vs. Myelophthisic   Hematology/ GI consult appreciated   Vitamin B12 Deficiency: cont w/ supplement w/ folic acid   f/u Flow Cytometry, [id number] levels, Antiparietal cell Ab and Anti-Intrinsic factor Antibody  s/p 2U pRBC transfusion, cont to monitor CBC  as per hematology will monitor for now    Thrombocytopenia:   as above   monitor for bleeding  cont monitor platelets consider transfusion if no improvement     Gastric Thickening   Ct a/p,- Mild gastric wall thickening, may reflect gastritis. Consider correlation   with upper endoscopy.  cont w/ protonix   GI on board; eventual EGD/Colonoscopy when blood lines are stable     Hypertension   - c/w metoprolol & resume home Norvasc    Hypokalemia  - replace w/ KCl    dvt ppx/   SCDs 70 yo F with history of HTN who presents to the ED, sent by her PMD for anemia and was admitted for pancytopenia.     Pancytopenia   - MDS vs. Myelophthisic   - Hematology/ GI consult appreciated   - may also be due to Vitamin B12 Deficiency  - c/w folic acid & cyanocobalamin   - f/u Flow Cytometry, [id number] levels, Antiparietal cell Ab and Anti-Intrinsic factor Antibody  - s/p 2U pRBC    - as per hematology, will need BM biopsy if count doesn't improve      Gastric Thickening   - CT showed mild gastric wall thickening, that may reflect gastritis  - c/w Protonix   - GI note read and appreciated, patient will eventually need EGD/Colonoscopy when blood lines are stable and patient is cleared by heme    Hypertension   - c/w metoprolol & resume home Norvasc    Hypokalemia  - replace w/ KCl    Prophylaxis:  DVT: SCD  GI: Protonix

## 2022-03-02 NOTE — PROGRESS NOTE ADULT - SUBJECTIVE AND OBJECTIVE BOX
Patient is a 71y old  Female who presents with a chief complaint of anemia (02 Mar 2022 11:14)      INTERVAL HPI/OVERNIGHT EVENTS:    MEDICATIONS  (STANDING):  cyanocobalamin Injectable 1000 MICROGram(s) IntraMuscular every 24 hours  folic acid 1 milliGRAM(s) Oral daily  metoprolol succinate ER 25 milliGRAM(s) Oral daily  pantoprazole    Tablet 40 milliGRAM(s) Oral before breakfast  sodium chloride 0.9%. 1000 milliLiter(s) (50 mL/Hr) IV Continuous <Continuous>    MEDICATIONS  (PRN):  acetaminophen     Tablet .. 650 milliGRAM(s) Oral every 6 hours PRN Temp greater or equal to 38C (100.4F)      Allergies    Vasotec (Swelling)    Intolerances        Vital Signs Last 24 Hrs  T(C): 37 (02 Mar 2022 17:38), Max: 37 (02 Mar 2022 17:38)  T(F): 98.6 (02 Mar 2022 17:38), Max: 98.6 (02 Mar 2022 17:38)  HR: 64 (02 Mar 2022 19:00) (61 - 77)  BP: 161/79 (02 Mar 2022 19:00) (125/59 - 179/79)  BP(mean): --  RR: 18 (02 Mar 2022 19:00) (17 - 19)  SpO2: 100% (02 Mar 2022 19:00) (98% - 100%)    PHYSICAL EXAM:  GENERAL: NAD, well-groomed, well-developed  HEAD:  Atraumatic, Normocephalic  EYES: EOMI, PERRLA, conjunctiva and sclera clear  ENMT: No tonsillar erythema, exudates, or enlargement; Moist mucous membranes, Good dentition, No lesions  NECK: Supple, No JVD, Normal thyroid  NERVOUS SYSTEM:  Alert & Oriented X3, Good concentration; Motor Strength 5/5 B/L upper and lower extremities; DTRs 2+ intact and symmetric  CHEST/LUNG: Clear to auscultation bilaterally; No rales, rhonchi, wheezing, or rubs  HEART: Regular rate and rhythm; No murmurs, rubs, or gallops  ABDOMEN: Soft, Nontender, Nondistended; Bowel sounds present  EXTREMITIES:  2+ Peripheral Pulses, No clubbing, cyanosis, or edema  LYMPH: No lymphadenopathy noted  SKIN: No rashes or lesions    LABS:                        7.9    5.18  )-----------( 39       ( 02 Mar 2022 07:55 )             24.6     03-02    143  |  111<H>  |  10  ----------------------------<  94  3.2<L>   |  25  |  0.67    Ca    8.9      02 Mar 2022 07:55    TPro  6.9  /  Alb  3.6  /  TBili  1.5<H>  /  DBili  x   /  AST  62<H>  /  ALT  49  /  AlkPhos  51  03-02        CAPILLARY BLOOD GLUCOSE          Culture - Blood (collected 27 Feb 2022 11:43)  Source: .Blood Blood-Peripheral  Preliminary Report (28 Feb 2022 12:01):    No growth to date.      RADIOLOGY & ADDITIONAL TESTS:    03-02-22 @ 07:01  -  03-02-22 @ 23:30  --------------------------------------------------------  IN:    Oral Fluid: 900 mL    sodium chloride 0.9%: 600 mL  Total IN: 1500 mL    OUT:  Total OUT: 0 mL    Total NET: 1500 mL       72 yo F with history of HTN who presents to the ED, sent by her PMD for anemia and was admitted for pancytopenia. She is lying in bed in NAD.     MEDICATIONS  (STANDING):  cyanocobalamin Injectable 1000 MICROGram(s) IntraMuscular every 24 hours  folic acid 1 milliGRAM(s) Oral daily  metoprolol succinate ER 25 milliGRAM(s) Oral daily  pantoprazole    Tablet 40 milliGRAM(s) Oral before breakfast  sodium chloride 0.9%. 1000 milliLiter(s) (50 mL/Hr) IV Continuous <Continuous>    MEDICATIONS  (PRN):  acetaminophen     Tablet .. 650 milliGRAM(s) Oral every 6 hours PRN Temp greater or equal to 38C (100.4F)      Allergies    Vasotec (Swelling)    Intolerances        Vital Signs Last 24 Hrs  T(C): 37 (02 Mar 2022 17:38), Max: 37 (02 Mar 2022 17:38)  T(F): 98.6 (02 Mar 2022 17:38), Max: 98.6 (02 Mar 2022 17:38)  HR: 64 (02 Mar 2022 19:00) (61 - 77)  BP: 161/79 (02 Mar 2022 19:00) (125/59 - 179/79)   RR: 18 (02 Mar 2022 19:00) (17 - 19)  SpO2: 100% (02 Mar 2022 19:00) (98% - 100%)    PHYSICAL EXAM:  GENERAL: NAD, well-groomed, well-developed  HEAD:  Atraumatic, Normocephalic  EYES: EOMI, PERRLA   NECK: Supple   NERVOUS SYSTEM: Alert & Oriented X3, Good concentration   CHEST/LUNG: Clear to auscultation bilaterally; No rales, rhonchi, wheezing, or rubs  HEART: Regular rate and rhythm; No murmurs, rubs, or gallops  ABDOMEN: Soft, Nontender, Nondistended; Bowel sounds present  EXTREMITIES: No clubbing, cyanosis, or edema     LABS:                        7.9    5.18  )-----------( 39       ( 02 Mar 2022 07:55 )             24.6     03-02    143  |  111<H>  |  10  ----------------------------<  94  3.2<L>   |  25  |  0.67    Ca    8.9      02 Mar 2022 07:55    TPro  6.9  /  Alb  3.6  /  TBili  1.5<H>  /  DBili  x   /  AST  62<H>  /  ALT  49  /  AlkPhos  51  03-02        CAPILLARY BLOOD GLUCOSE          Culture - Blood (collected 27 Feb 2022 11:43)  Source: .Blood Blood-Peripheral  Preliminary Report (28 Feb 2022 12:01):    No growth to date.      RADIOLOGY & ADDITIONAL TESTS:    03-02-22 @ 07:01  -  03-02-22 @ 23:30  --------------------------------------------------------  IN:    Oral Fluid: 900 mL    sodium chloride 0.9%: 600 mL  Total IN: 1500 mL    OUT:  Total OUT: 0 mL    Total NET: 1500 mL

## 2022-03-02 NOTE — PROGRESS NOTE ADULT - SUBJECTIVE AND OBJECTIVE BOX
lying in bed, feeling well    Vital Signs Last 24 Hrs  T(C): 36.7 (02 Mar 2022 04:26), Max: 36.8 (01 Mar 2022 10:54)  T(F): 98 (02 Mar 2022 04:26), Max: 98.2 (01 Mar 2022 10:54)  HR: 61 (02 Mar 2022 00:46) (61 - 71)  BP: 160/68 (02 Mar 2022 04:26) (125/59 - 160/68)  BP(mean): --  RR: 19 (02 Mar 2022 04:26) (16 - 19)  SpO2: 99% (02 Mar 2022 04:26) (97% - 99%)    PHYSICAL EXAM:    general - AAO x 3  HEENT - No Icterus  CVS - RRR  RS - AE B/L  Abd - soft, NT  Ext - Pulses +        LABS:                        7.9    x     )-----------( x        ( 02 Mar 2022 07:55 )             24.6     03-02    143  |  111<H>  |  10  ----------------------------<  94  3.2<L>   |  25  |  0.67    Ca    8.9      02 Mar 2022 07:55    TPro  6.9  /  Alb  3.6  /  TBili  1.5<H>  /  DBili  x   /  AST  62<H>  /  ALT  49  /  AlkPhos  51  03-02          Culture - Blood (collected 27 Feb 2022 11:43)  Source: .Blood Blood-Peripheral  Preliminary Report (28 Feb 2022 12:01):    No growth to date.

## 2022-03-02 NOTE — PROGRESS NOTE ADULT - SUBJECTIVE AND OBJECTIVE BOX
Appreciate hematology note  +anemia secondary to Vit B12 deficiency        MEDICATIONS  (STANDING):  cyanocobalamin Injectable 1000 MICROGram(s) IntraMuscular every 24 hours  folic acid 1 milliGRAM(s) Oral daily  metoprolol succinate ER 25 milliGRAM(s) Oral daily  pantoprazole    Tablet 40 milliGRAM(s) Oral before breakfast  potassium chloride    Tablet ER 40 milliEquivalent(s) Oral every 4 hours  sodium chloride 0.9%. 1000 milliLiter(s) (50 mL/Hr) IV Continuous <Continuous>    MEDICATIONS  (PRN):  acetaminophen     Tablet .. 650 milliGRAM(s) Oral every 6 hours PRN Temp greater or equal to 38C (100.4F)      Allergies    Vasotec (Swelling)    Intolerances        Vital Signs Last 24 Hrs  T(C): 36.7 (02 Mar 2022 04:26), Max: 36.8 (02 Mar 2022 00:46)  T(F): 98 (02 Mar 2022 04:26), Max: 98.2 (02 Mar 2022 00:46)  HR: 61 (02 Mar 2022 00:46) (61 - 69)  BP: 160/68 (02 Mar 2022 04:26) (125/59 - 160/68)  BP(mean): --  RR: 19 (02 Mar 2022 04:26) (17 - 19)  SpO2: 99% (02 Mar 2022 04:26) (98% - 99%)    PHYSICAL EXAM:  General: NAD.  CVS: S1, S2  Chest: air entry bilaterally present  Abd: BS present, soft, non-tender      LABS:                        7.9    5.18  )-----------( 39       ( 02 Mar 2022 07:55 )             24.6     03-02    143  |  111<H>  |  10  ----------------------------<  94  3.2<L>   |  25  |  0.67    Ca    8.9      02 Mar 2022 07:55    TPro  6.9  /  Alb  3.6  /  TBili  1.5<H>  /  DBili  x   /  AST  62<H>  /  ALT  49  /  AlkPhos  51  03-02        follow labs  continue as per hematology  might eventually need EGD/Colon

## 2022-03-03 LAB
ALBUMIN SERPL ELPH-MCNC: 3.8 G/DL — SIGNIFICANT CHANGE UP (ref 3.3–5)
ALP SERPL-CCNC: 56 U/L — SIGNIFICANT CHANGE UP (ref 40–120)
ALT FLD-CCNC: 51 U/L — SIGNIFICANT CHANGE UP (ref 12–78)
ANION GAP SERPL CALC-SCNC: 5 MMOL/L — SIGNIFICANT CHANGE UP (ref 5–17)
AST SERPL-CCNC: 52 U/L — HIGH (ref 15–37)
BASOPHILS # BLD AUTO: 0.01 K/UL — SIGNIFICANT CHANGE UP (ref 0–0.2)
BASOPHILS NFR BLD AUTO: 0.2 % — SIGNIFICANT CHANGE UP (ref 0–2)
BILIRUB SERPL-MCNC: 1.3 MG/DL — HIGH (ref 0.2–1.2)
BUN SERPL-MCNC: 15 MG/DL — SIGNIFICANT CHANGE UP (ref 7–23)
CALCIUM SERPL-MCNC: 9.1 MG/DL — SIGNIFICANT CHANGE UP (ref 8.5–10.1)
CHLORIDE SERPL-SCNC: 112 MMOL/L — HIGH (ref 96–108)
CO2 SERPL-SCNC: 25 MMOL/L — SIGNIFICANT CHANGE UP (ref 22–31)
CREAT SERPL-MCNC: 0.72 MG/DL — SIGNIFICANT CHANGE UP (ref 0.5–1.3)
EGFR: 88 ML/MIN/1.73M2 — SIGNIFICANT CHANGE UP
EOSINOPHIL # BLD AUTO: 0.15 K/UL — SIGNIFICANT CHANGE UP (ref 0–0.5)
EOSINOPHIL NFR BLD AUTO: 3.2 % — SIGNIFICANT CHANGE UP (ref 0–6)
GLUCOSE SERPL-MCNC: 90 MG/DL — SIGNIFICANT CHANGE UP (ref 70–99)
HCT VFR BLD CALC: 28.1 % — LOW (ref 34.5–45)
HGB BLD-MCNC: 8.9 G/DL — LOW (ref 11.5–15.5)
IMM GRANULOCYTES NFR BLD AUTO: 1.5 % — SIGNIFICANT CHANGE UP (ref 0–1.5)
LDH SERPL L TO P-CCNC: 3790 U/L — HIGH (ref 50–242)
LYMPHOCYTES # BLD AUTO: 2 K/UL — SIGNIFICANT CHANGE UP (ref 1–3.3)
LYMPHOCYTES # BLD AUTO: 42.3 % — SIGNIFICANT CHANGE UP (ref 13–44)
MAGNESIUM SERPL-MCNC: 2.3 MG/DL — SIGNIFICANT CHANGE UP (ref 1.6–2.6)
MCHC RBC-ENTMCNC: 30.6 PG — SIGNIFICANT CHANGE UP (ref 27–34)
MCHC RBC-ENTMCNC: 31.7 G/DL — LOW (ref 32–36)
MCV RBC AUTO: 96.6 FL — SIGNIFICANT CHANGE UP (ref 80–100)
MONOCYTES # BLD AUTO: 0.57 K/UL — SIGNIFICANT CHANGE UP (ref 0–0.9)
MONOCYTES NFR BLD AUTO: 12.1 % — SIGNIFICANT CHANGE UP (ref 2–14)
NEUTROPHILS # BLD AUTO: 1.93 K/UL — SIGNIFICANT CHANGE UP (ref 1.8–7.4)
NEUTROPHILS NFR BLD AUTO: 40.7 % — LOW (ref 43–77)
NRBC # BLD: 9 /100 WBCS — HIGH (ref 0–0)
PHOSPHATE SERPL-MCNC: 4.2 MG/DL — SIGNIFICANT CHANGE UP (ref 2.5–4.5)
PLATELET # BLD AUTO: 44 K/UL — LOW (ref 150–400)
POTASSIUM SERPL-MCNC: 4.1 MMOL/L — SIGNIFICANT CHANGE UP (ref 3.5–5.3)
POTASSIUM SERPL-SCNC: 4.1 MMOL/L — SIGNIFICANT CHANGE UP (ref 3.5–5.3)
PROT SERPL-MCNC: 7.4 GM/DL — SIGNIFICANT CHANGE UP (ref 6–8.3)
RBC # BLD: 2.91 M/UL — LOW (ref 3.8–5.2)
RBC # BLD: 2.91 M/UL — LOW (ref 3.8–5.2)
RBC # FLD: 24.9 % — HIGH (ref 10.3–14.5)
RETICS #: 338.7 K/UL — HIGH (ref 25–125)
RETICS/RBC NFR: 11.5 % — HIGH (ref 0.5–2.5)
SODIUM SERPL-SCNC: 142 MMOL/L — SIGNIFICANT CHANGE UP (ref 135–145)
TM INTERPRETATION: SIGNIFICANT CHANGE UP
WBC # BLD: 4.73 K/UL — SIGNIFICANT CHANGE UP (ref 3.8–10.5)
WBC # FLD AUTO: 4.73 K/UL — SIGNIFICANT CHANGE UP (ref 3.8–10.5)

## 2022-03-03 PROCEDURE — 99232 SBSQ HOSP IP/OBS MODERATE 35: CPT

## 2022-03-03 RX ADMIN — PANTOPRAZOLE SODIUM 40 MILLIGRAM(S): 20 TABLET, DELAYED RELEASE ORAL at 07:55

## 2022-03-03 RX ADMIN — SODIUM CHLORIDE 50 MILLILITER(S): 9 INJECTION INTRAMUSCULAR; INTRAVENOUS; SUBCUTANEOUS at 21:51

## 2022-03-03 RX ADMIN — PREGABALIN 1000 MICROGRAM(S): 225 CAPSULE ORAL at 11:49

## 2022-03-03 RX ADMIN — Medication 1 MILLIGRAM(S): at 11:50

## 2022-03-03 RX ADMIN — AMLODIPINE BESYLATE 2.5 MILLIGRAM(S): 2.5 TABLET ORAL at 00:59

## 2022-03-03 RX ADMIN — Medication 25 MILLIGRAM(S): at 05:56

## 2022-03-03 NOTE — PROGRESS NOTE ADULT - ASSESSMENT
72 yo F with history of HTN who presents to the ED, sent by her PMD for anemia and was admitted for pancytopenia.     Pancytopenia   - Hematology/ GI consult appreciated   - may also be due to Vitamin B12 Deficiency  - c/w folic acid & cyanocobalamin x 7 days total, the she will need B12 weekly x 4 times and then Monthly for life  - Intrinsic Factor Antibody - elevated - indicative of Pernicious Anemia - GI will do Endoscopy for Gastric Thickening as outpatient as per Dr. Harrison  - s/p 2U pRBC    - as per hematology, will need BM biopsy if count doesn't improve      Gastric Thickening   - CT showed mild gastric wall thickening, that may reflect gastritis  - c/w Protonix   - GI note read and appreciated, patient will eventually need EGD/Colonoscopy when blood lines are stable and patient is cleared by heme    Hypertension   - c/w metoprolol & Norvasc    Hypokalemia  - replace w/ KCl    Prophylaxis:  DVT: SCD  GI: Protonix

## 2022-03-03 NOTE — PROGRESS NOTE ADULT - SUBJECTIVE AND OBJECTIVE BOX
lying in bed      Vital Signs Last 24 Hrs  T(C): 36.9 (03 Mar 2022 05:36), Max: 37 (02 Mar 2022 17:38)  T(F): 98.4 (03 Mar 2022 05:36), Max: 98.6 (02 Mar 2022 17:38)  HR: 68 (03 Mar 2022 05:36) (64 - 77)  BP: 152/80 (03 Mar 2022 05:36) (143/84 - 179/79)  BP(mean): --  RR: 16 (03 Mar 2022 05:36) (16 - 18)  SpO2: 99% (03 Mar 2022 05:36) (98% - 100%)    PHYSICAL EXAM:    general - AAO x 3  HEENT - No Icterus  CVS - RRR  RS - AE B/L  Abd - soft, NT  Ext - Pulses +        LABS:                        8.9    4.73  )-----------( 44       ( 03 Mar 2022 07:39 )             28.1     03-03    142  |  112<H>  |  15  ----------------------------<  90  4.1   |  25  |  0.72    Ca    9.1      03 Mar 2022 07:39  Phos  4.2     03-03  Mg     2.3     03-03    TPro  7.4  /  Alb  3.8  /  TBili  1.3<H>  /  DBili  x   /  AST  52<H>  /  ALT  51  /  AlkPhos  56  03-03          Culture - Blood (collected 27 Feb 2022 11:43)  Source: .Blood Blood-Peripheral  Preliminary Report (28 Feb 2022 12:01):    No growth to date.

## 2022-03-03 NOTE — PROGRESS NOTE ADULT - SUBJECTIVE AND OBJECTIVE BOX
Pt stable - labs improving  Pt noted to have thickened gastric wall on CT      MEDICATIONS  (STANDING):  amLODIPine   Tablet 2.5 milliGRAM(s) Oral daily  cyanocobalamin Injectable 1000 MICROGram(s) IntraMuscular every 24 hours  folic acid 1 milliGRAM(s) Oral daily  metoprolol succinate ER 25 milliGRAM(s) Oral daily  pantoprazole    Tablet 40 milliGRAM(s) Oral before breakfast  sodium chloride 0.9%. 1000 milliLiter(s) (50 mL/Hr) IV Continuous <Continuous>    MEDICATIONS  (PRN):  acetaminophen     Tablet .. 650 milliGRAM(s) Oral every 6 hours PRN Temp greater or equal to 38C (100.4F)      Allergies    Vasotec (Swelling)    Intolerances        Vital Signs Last 24 Hrs  T(C): 36.9 (03 Mar 2022 12:30), Max: 37 (02 Mar 2022 17:38)  T(F): 98.4 (03 Mar 2022 12:30), Max: 98.6 (02 Mar 2022 17:38)  HR: 79 (03 Mar 2022 12:30) (64 - 79)  BP: 155/77 (03 Mar 2022 12:30) (143/84 - 179/79)  BP(mean): --  RR: 18 (03 Mar 2022 12:30) (16 - 18)  SpO2: 100% (03 Mar 2022 12:30) (99% - 100%)    PHYSICAL EXAM:  General: NAD.  CVS: S1, S2  Chest: air entry bilaterally present  Abd: BS present, soft, non-tender      LABS:                        8.9    4.73  )-----------( 44       ( 03 Mar 2022 07:39 )             28.1     03-03    142  |  112<H>  |  15  ----------------------------<  90  4.1   |  25  |  0.72    Ca    9.1      03 Mar 2022 07:39  Phos  4.2     03-03  Mg     2.3     03-03    TPro  7.4  /  Alb  3.8  /  TBili  1.3<H>  /  DBili  x   /  AST  52<H>  /  ALT  51  /  AlkPhos  56  03-03        consider EGD tomorrow if endoscopy time available - otherwise to be arranged as outpatient

## 2022-03-03 NOTE — PROGRESS NOTE ADULT - SUBJECTIVE AND OBJECTIVE BOX
72 yo F with history of HTN who presents to the ED, sent by her PMD for anemia and was admitted for pancytopenia. She is lying in bed in NAD.       MEDICATIONS  (STANDING):  amLODIPine   Tablet 2.5 milliGRAM(s) Oral daily  cyanocobalamin Injectable 1000 MICROGram(s) IntraMuscular every 24 hours  folic acid 1 milliGRAM(s) Oral daily  metoprolol succinate ER 25 milliGRAM(s) Oral daily  pantoprazole    Tablet 40 milliGRAM(s) Oral before breakfast  sodium chloride 0.9%. 1000 milliLiter(s) (50 mL/Hr) IV Continuous <Continuous>    MEDICATIONS  (PRN):  acetaminophen     Tablet .. 650 milliGRAM(s) Oral every 6 hours PRN Temp greater or equal to 38C (100.4F)      Allergies    Vasotec (Swelling)    Intolerances        Vital Signs Last 24 Hrs  T(C): 36.9 (03 Mar 2022 12:30), Max: 36.9 (03 Mar 2022 05:36)  T(F): 98.4 (03 Mar 2022 12:30), Max: 98.4 (03 Mar 2022 05:36)  HR: 79 (03 Mar 2022 12:30) (68 - 79)  BP: 155/77 (03 Mar 2022 12:30) (143/84 - 155/77)  BP(mean): --  RR: 18 (03 Mar 2022 12:30) (16 - 18)  SpO2: 100% (03 Mar 2022 12:30) (99% - 100%)    PHYSICAL EXAM:  GENERAL: NAD, well-groomed, well-developed  HEAD:  Atraumatic, Normocephalic  EYES: EOMI, PERRLA   NECK: Supple   NERVOUS SYSTEM: Alert & Oriented X3, Good concentration   CHEST/LUNG: Clear to auscultation bilaterally; No rales, rhonchi, wheezing, or rubs  HEART: Regular rate and rhythm; No murmurs, rubs, or gallops  ABDOMEN: Soft, Nontender, Nondistended; Bowel sounds present  EXTREMITIES: No clubbing, cyanosis, or edema      LABS:                        8.9    4.73  )-----------( 44       ( 03 Mar 2022 07:39 )             28.1     03-03    142  |  112<H>  |  15  ----------------------------<  90  4.1   |  25  |  0.72    Ca    9.1      03 Mar 2022 07:39  Phos  4.2     03-03  Mg     2.3     03-03    TPro  7.4  /  Alb  3.8  /  TBili  1.3<H>  /  DBili  x   /  AST  52<H>  /  ALT  51  /  AlkPhos  56  03-03        CAPILLARY BLOOD GLUCOSE          Culture - Blood (collected 27 Feb 2022 11:43)  Source: .Blood Blood-Peripheral  Preliminary Report (28 Feb 2022 12:01):    No growth to date.      RADIOLOGY & ADDITIONAL TESTS:    03-02-22 @ 07:01  -  03-03-22 @ 07:00  --------------------------------------------------------  IN:    Oral Fluid: 900 mL    sodium chloride 0.9%: 600 mL  Total IN: 1500 mL    OUT:  Total OUT: 0 mL    Total NET: 1500 mL      03-03-22 @ 07:01  -  03-03-22 @ 21:39  --------------------------------------------------------  IN:    sodium chloride 0.9%: 600 mL  Total IN: 600 mL    OUT:  Total OUT: 0 mL    Total NET: 600 mL

## 2022-03-04 ENCOUNTER — TRANSCRIPTION ENCOUNTER (OUTPATIENT)
Age: 72
End: 2022-03-04

## 2022-03-04 VITALS
SYSTOLIC BLOOD PRESSURE: 164 MMHG | TEMPERATURE: 99 F | HEART RATE: 73 BPM | RESPIRATION RATE: 18 BRPM | DIASTOLIC BLOOD PRESSURE: 89 MMHG | OXYGEN SATURATION: 100 %

## 2022-03-04 LAB
ALBUMIN SERPL ELPH-MCNC: 3.9 G/DL — SIGNIFICANT CHANGE UP (ref 3.3–5)
ALP SERPL-CCNC: 56 U/L — SIGNIFICANT CHANGE UP (ref 40–120)
ALT FLD-CCNC: 46 U/L — SIGNIFICANT CHANGE UP (ref 12–78)
ANION GAP SERPL CALC-SCNC: 5 MMOL/L — SIGNIFICANT CHANGE UP (ref 5–17)
AST SERPL-CCNC: 43 U/L — HIGH (ref 15–37)
BASOPHILS # BLD AUTO: 0 K/UL — SIGNIFICANT CHANGE UP (ref 0–0.2)
BASOPHILS NFR BLD AUTO: 0 % — SIGNIFICANT CHANGE UP (ref 0–2)
BILIRUB DIRECT SERPL-MCNC: 0.4 MG/DL — HIGH (ref 0–0.3)
BILIRUB INDIRECT FLD-MCNC: 1 MG/DL — SIGNIFICANT CHANGE UP (ref 0.2–1)
BILIRUB SERPL-MCNC: 1.4 MG/DL — HIGH (ref 0.2–1.2)
BUN SERPL-MCNC: 17 MG/DL — SIGNIFICANT CHANGE UP (ref 7–23)
CALCIUM SERPL-MCNC: 9.2 MG/DL — SIGNIFICANT CHANGE UP (ref 8.5–10.1)
CHLORIDE SERPL-SCNC: 111 MMOL/L — HIGH (ref 96–108)
CO2 SERPL-SCNC: 26 MMOL/L — SIGNIFICANT CHANGE UP (ref 22–31)
CREAT SERPL-MCNC: 0.69 MG/DL — SIGNIFICANT CHANGE UP (ref 0.5–1.3)
CULTURE RESULTS: SIGNIFICANT CHANGE UP
EGFR: 92 ML/MIN/1.73M2 — SIGNIFICANT CHANGE UP
EOSINOPHIL # BLD AUTO: 0.11 K/UL — SIGNIFICANT CHANGE UP (ref 0–0.5)
EOSINOPHIL NFR BLD AUTO: 2.9 % — SIGNIFICANT CHANGE UP (ref 0–6)
GLUCOSE SERPL-MCNC: 88 MG/DL — SIGNIFICANT CHANGE UP (ref 70–99)
HCT VFR BLD CALC: 29.3 % — LOW (ref 34.5–45)
HGB BLD-MCNC: 9 G/DL — LOW (ref 11.5–15.5)
IMM GRANULOCYTES NFR BLD AUTO: 0.8 % — SIGNIFICANT CHANGE UP (ref 0–1.5)
LDH SERPL L TO P-CCNC: 3193 U/L — HIGH (ref 50–242)
LYMPHOCYTES # BLD AUTO: 1.13 K/UL — SIGNIFICANT CHANGE UP (ref 1–3.3)
LYMPHOCYTES # BLD AUTO: 30.1 % — SIGNIFICANT CHANGE UP (ref 13–44)
MAGNESIUM SERPL-MCNC: 1.7 MG/DL — SIGNIFICANT CHANGE UP (ref 1.6–2.6)
MCHC RBC-ENTMCNC: 30.1 PG — SIGNIFICANT CHANGE UP (ref 27–34)
MCHC RBC-ENTMCNC: 30.7 G/DL — LOW (ref 32–36)
MCV RBC AUTO: 98 FL — SIGNIFICANT CHANGE UP (ref 80–100)
MONOCYTES # BLD AUTO: 0.49 K/UL — SIGNIFICANT CHANGE UP (ref 0–0.9)
MONOCYTES NFR BLD AUTO: 13.1 % — SIGNIFICANT CHANGE UP (ref 2–14)
NEUTROPHILS # BLD AUTO: 1.99 K/UL — SIGNIFICANT CHANGE UP (ref 1.8–7.4)
NEUTROPHILS NFR BLD AUTO: 53.1 % — SIGNIFICANT CHANGE UP (ref 43–77)
NRBC # BLD: 3 /100 WBCS — HIGH (ref 0–0)
PLATELET # BLD AUTO: 66 K/UL — LOW (ref 150–400)
POTASSIUM SERPL-MCNC: 3.2 MMOL/L — LOW (ref 3.5–5.3)
POTASSIUM SERPL-SCNC: 3.2 MMOL/L — LOW (ref 3.5–5.3)
PROT SERPL-MCNC: 7.6 GM/DL — SIGNIFICANT CHANGE UP (ref 6–8.3)
RBC # BLD: 2.99 M/UL — LOW (ref 3.8–5.2)
RBC # BLD: 2.99 M/UL — LOW (ref 3.8–5.2)
RBC # FLD: 24.2 % — HIGH (ref 10.3–14.5)
RETICS #: 512.3 K/UL — HIGH (ref 25–125)
RETICS/RBC NFR: 17.3 % — HIGH (ref 0.5–2.5)
SODIUM SERPL-SCNC: 142 MMOL/L — SIGNIFICANT CHANGE UP (ref 135–145)
SPECIMEN SOURCE: SIGNIFICANT CHANGE UP
WBC # BLD: 3.75 K/UL — LOW (ref 3.8–10.5)
WBC # FLD AUTO: 3.75 K/UL — LOW (ref 3.8–10.5)

## 2022-03-04 PROCEDURE — 99239 HOSP IP/OBS DSCHRG MGMT >30: CPT

## 2022-03-04 RX ORDER — POTASSIUM CHLORIDE 20 MEQ
40 PACKET (EA) ORAL EVERY 4 HOURS
Refills: 0 | Status: DISCONTINUED | OUTPATIENT
Start: 2022-03-04 | End: 2022-03-04

## 2022-03-04 RX ORDER — FOLIC ACID 0.8 MG
1 TABLET ORAL
Qty: 14 | Refills: 0
Start: 2022-03-04 | End: 2022-03-17

## 2022-03-04 RX ADMIN — Medication 40 MILLIEQUIVALENT(S): at 12:02

## 2022-03-04 RX ADMIN — Medication 25 MILLIGRAM(S): at 05:20

## 2022-03-04 RX ADMIN — Medication 1 MILLIGRAM(S): at 11:59

## 2022-03-04 RX ADMIN — AMLODIPINE BESYLATE 2.5 MILLIGRAM(S): 2.5 TABLET ORAL at 05:19

## 2022-03-04 RX ADMIN — PREGABALIN 1000 MICROGRAM(S): 225 CAPSULE ORAL at 11:59

## 2022-03-04 RX ADMIN — PANTOPRAZOLE SODIUM 40 MILLIGRAM(S): 20 TABLET, DELAYED RELEASE ORAL at 07:55

## 2022-03-04 RX ADMIN — Medication 40 MILLIEQUIVALENT(S): at 17:05

## 2022-03-04 NOTE — DISCHARGE NOTE PROVIDER - HOSPITAL COURSE
72 yo F with history of HTN who was sent by her PMD for anemia and was admitted for pancytopenia. Hematology was consulted and the patient was given 2U pRBC & started on IM cyanocobalamin and folic acid for Vitamin B12 Deficiency. On discharge, she was told that she will need B12 shots weekly x 4 times and then monthly for life and will follow up w/ her PMD. Pt probably has Pernicious Anemia as she was positive Anti-Intrinsic Factor Antibody. CT showed mild gastric wall thickening, that may reflect gastritis. GI was consulted as patient will need upper endoscopy and coloscopy, which Dr. Harrison says patient will get as outpatient. She is aware and will follow up w/ him upon discharge. Of note, flow cytometry showed small aberrant T- Cell population. Dr. Vogt discussed this with the patient and recommended a BM biopsy along with molecular studies but the patient declined. She was asked to follow up w/ him as outpatient .    Discharge time: 43 minutes

## 2022-03-04 NOTE — DISCHARGE NOTE NURSING/CASE MANAGEMENT/SOCIAL WORK - PATIENT PORTAL LINK FT
You can access the FollowMyHealth Patient Portal offered by [facility] by registering at the following website: [email or web address]. By joining [facility] FollowMyHealth portal, you will also be able to view your health information using other applications (apps) compatible with our system.

## 2022-03-04 NOTE — PROGRESS NOTE ADULT - PROBLEM SELECTOR PROBLEM 1
Anemia due to vitamin B12 deficiency

## 2022-03-04 NOTE — PROGRESS NOTE ADULT - SUBJECTIVE AND OBJECTIVE BOX
lying in bed, feeling well    Vital Signs Last 24 Hrs  T(C): 36.7 (04 Mar 2022 05:49), Max: 36.9 (03 Mar 2022 12:30)  T(F): 98.1 (04 Mar 2022 05:49), Max: 98.4 (03 Mar 2022 12:30)  HR: 71 (04 Mar 2022 05:49) (66 - 79)  BP: 154/86 (04 Mar 2022 05:49) (136/76 - 155/77)  BP(mean): --  RR: 18 (04 Mar 2022 05:49) (18 - 18)  SpO2: 98% (04 Mar 2022 05:49) (98% - 100%)    PHYSICAL EXAM:    general - AAO x 3  HEENT - No Icterus  CVS - RRR  RS - AE B/L  Abd - soft, NT  Ext - Pulses +        LABS:                        9.0    3.75  )-----------( 66       ( 04 Mar 2022 07:55 )             29.3     03-04    142  |  111<H>  |  17  ----------------------------<  88  3.2<L>   |  26  |  0.69    Ca    9.2      04 Mar 2022 07:55  Phos  4.2     03-03  Mg     2.3     03-03    TPro  7.6  /  Alb  3.9  /  TBili  1.4<H>  /  DBili  0.4<H>  /  AST  43<H>  /  ALT  46  /  AlkPhos  56  03-04          Culture - Blood (collected 27 Feb 2022 11:43)  Source: .Blood Blood-Peripheral  Preliminary Report (28 Feb 2022 12:01):    No growth to date.

## 2022-03-04 NOTE — DISCHARGE NOTE PROVIDER - CARE PROVIDER_API CALL
Yana Vogt)  Hematology; Internal Medicine; Medical Oncology  [address], [address]  [place] [address]  Phone: [phone]  Fax: [phone]  Follow Up Time: 1-3 days    Your PMD,   Phone: (   )    -  Fax: (   )    -  Follow Up Time: 1-3 days    Fredy Harrison)  Internal Medicine  [address], [address]  [place] [address]  Phone: [phone]  Fax: [phone]  Follow Up Time:

## 2022-03-04 NOTE — DISCHARGE NOTE PROVIDER - NSDCMRMEDTOKEN_GEN_ALL_CORE_FT
amLODIPine 2.5 mg oral tablet: 1 tab(s) orally once a day  folic acid 1 mg oral tablet: 1 tab(s) orally once a day  metoprolol tartrate 100 mg oral tablet: 1 tab(s) orally once a day

## 2022-03-04 NOTE — PROGRESS NOTE ADULT - PROVIDER SPECIALTY LIST ADULT
Gastroenterology
Gastroenterology
Hospitalist
Gastroenterology
Hospitalist
Hospitalist
Heme/Onc

## 2022-03-04 NOTE — DISCHARGE NOTE PROVIDER - PROVIDER TOKENS
PROVIDER:[TOKEN:[[id number]],FOLLOWUP:[1-3 days]],FREE:[LAST:[Your PMD],PHONE:[(   )    -],FAX:[(   )    -],FOLLOWUP:[1-3 days]],PROVIDER:[TOKEN:[[id number]]]

## 2022-03-04 NOTE — DISCHARGE NOTE PROVIDER - DISCHARGE DIET
09/25/19    Dr. Mays,     Please sign the attached INR referral. Thank you.    Lo Worley RN, BSN, PHN  [facility]   [phone]         Regular Diet - No restrictions

## 2022-03-04 NOTE — PROGRESS NOTE ADULT - SUBJECTIVE AND OBJECTIVE BOX
Pt stable - appreciate Hematology note  Labs improving      MEDICATIONS  (STANDING):  amLODIPine   Tablet 2.5 milliGRAM(s) Oral daily  cyanocobalamin Injectable 1000 MICROGram(s) IntraMuscular every 24 hours  folic acid 1 milliGRAM(s) Oral daily  metoprolol succinate ER 25 milliGRAM(s) Oral daily  pantoprazole    Tablet 40 milliGRAM(s) Oral before breakfast  potassium chloride    Tablet ER 40 milliEquivalent(s) Oral every 4 hours  sodium chloride 0.9%. 1000 milliLiter(s) (50 mL/Hr) IV Continuous <Continuous>    MEDICATIONS  (PRN):  acetaminophen     Tablet .. 650 milliGRAM(s) Oral every 6 hours PRN Temp greater or equal to 38C (100.4F)      Allergies    Vasotec (Swelling)    Intolerances        Vital Signs Last 24 Hrs  T(C): 36.9 (04 Mar 2022 10:51), Max: 36.9 (04 Mar 2022 10:51)  T(F): 98.4 (04 Mar 2022 10:51), Max: 98.4 (04 Mar 2022 10:51)  HR: 77 (04 Mar 2022 10:51) (66 - 77)  BP: 161/82 (04 Mar 2022 10:51) (136/76 - 161/82)  BP(mean): --  RR: 18 (04 Mar 2022 10:51) (18 - 18)  SpO2: 97% (04 Mar 2022 10:51) (97% - 99%)    PHYSICAL EXAM:  General: NAD.  CVS: S1, S2  Chest: air entry bilaterally present  Abd: BS present, soft, non-tender      LABS:                        9.0    3.75  )-----------( 66       ( 04 Mar 2022 07:55 )             29.3     03-04    142  |  111<H>  |  17  ----------------------------<  88  3.2<L>   |  26  |  0.69    Ca    9.2      04 Mar 2022 07:55  Phos  4.2     03-03  Mg     1.7     03-04    TPro  7.6  /  Alb  3.9  /  TBili  1.4<H>  /  DBili  0.4<H>  /  AST  43<H>  /  ALT  46  /  AlkPhos  56  03-04      continue present treatment plan  Pt aware of need for EGD - may be done as outpt

## 2022-03-04 NOTE — CHART NOTE - NSCHARTNOTEFT_GEN_A_CORE
- d/w patients anni Wagner [phone] d/w him discharge plan and what needs to be done including Bone Marrow biopsy, Endoscopy and colonscopy, as patient lives in [facility] they think they will follow up with her PMD as may not be able to follow up with us, patient and family aware - d/w patients anni Wagner [phone] d/w him discharge plan and what needs to be done including Bone Marrow biopsy, Endoscopy and colonscopy, as patient lives in [facility] they think they will follow up with her PMD as may not be able to follow up with us, patient and family aware, also need to follow up with hematology and GI for other testing

## 2022-03-04 NOTE — PROGRESS NOTE ADULT - REASON FOR ADMISSION
anemia

## 2022-03-10 DIAGNOSIS — E87.6 HYPOKALEMIA: ICD-10-CM

## 2022-03-10 DIAGNOSIS — D51.0 VITAMIN B12 DEFICIENCY ANEMIA DUE TO INTRINSIC FACTOR DEFICIENCY: ICD-10-CM

## 2022-03-10 DIAGNOSIS — R94.5 ABNORMAL RESULTS OF LIVER FUNCTION STUDIES: ICD-10-CM

## 2022-03-10 DIAGNOSIS — D64.9 ANEMIA, UNSPECIFIED: ICD-10-CM

## 2022-03-10 DIAGNOSIS — D61.818 OTHER PANCYTOPENIA: ICD-10-CM

## 2022-03-10 DIAGNOSIS — I10 ESSENTIAL (PRIMARY) HYPERTENSION: ICD-10-CM

## 2022-03-10 DIAGNOSIS — Z20.822 CONTACT WITH AND (SUSPECTED) EXPOSURE TO COVID-19: ICD-10-CM

## 2023-02-26 ENCOUNTER — EMERGENCY (EMERGENCY)
Facility: HOSPITAL | Age: 73
LOS: 0 days | Discharge: HOME | End: 2023-02-27
Attending: STUDENT IN AN ORGANIZED HEALTH CARE EDUCATION/TRAINING PROGRAM
Payer: MEDICARE

## 2023-02-26 VITALS
WEIGHT: 125 LBS | OXYGEN SATURATION: 98 % | SYSTOLIC BLOOD PRESSURE: 155 MMHG | HEART RATE: 77 BPM | DIASTOLIC BLOOD PRESSURE: 88 MMHG | RESPIRATION RATE: 18 BRPM | TEMPERATURE: 98 F | HEIGHT: 59 IN

## 2023-02-26 DIAGNOSIS — D51.0 VITAMIN B12 DEFICIENCY ANEMIA DUE TO INTRINSIC FACTOR DEFICIENCY: ICD-10-CM

## 2023-02-26 DIAGNOSIS — R25.1 TREMOR, UNSPECIFIED: ICD-10-CM

## 2023-02-26 DIAGNOSIS — Z86.2 PERSONAL HISTORY OF DISEASES OF THE BLOOD AND BLOOD-FORMING ORGANS AND CERTAIN DISORDERS INVOLVING THE IMMUNE MECHANISM: ICD-10-CM

## 2023-02-26 DIAGNOSIS — I10 ESSENTIAL (PRIMARY) HYPERTENSION: ICD-10-CM

## 2023-02-26 DIAGNOSIS — Z88.8 ALLERGY STATUS TO OTHER DRUGS, MEDICAMENTS AND BIOLOGICAL SUBSTANCES: ICD-10-CM

## 2023-02-26 DIAGNOSIS — N39.0 URINARY TRACT INFECTION, SITE NOT SPECIFIED: ICD-10-CM

## 2023-02-26 PROCEDURE — 99285 EMERGENCY DEPT VISIT HI MDM: CPT

## 2023-02-26 NOTE — ED ADULT TRIAGE NOTE - CHIEF COMPLAINT QUOTE
pt Aox4, ambulatory with steady gait, here for "shivering" since this afternoon.  Pt reports was at home when she felt warmness in her back and shivering came afterwards.  Denies any chest pain, LOC, n/v/d, sob, pain.  hx of leukemia, htn

## 2023-02-27 VITALS
SYSTOLIC BLOOD PRESSURE: 144 MMHG | OXYGEN SATURATION: 99 % | DIASTOLIC BLOOD PRESSURE: 73 MMHG | TEMPERATURE: 98 F | HEART RATE: 64 BPM | RESPIRATION RATE: 16 BRPM

## 2023-02-27 LAB
ALBUMIN SERPL ELPH-MCNC: 3.6 G/DL — SIGNIFICANT CHANGE UP (ref 3.3–5)
ALP SERPL-CCNC: 100 U/L — SIGNIFICANT CHANGE UP (ref 40–120)
ALT FLD-CCNC: 36 U/L — SIGNIFICANT CHANGE UP (ref 12–78)
ANION GAP SERPL CALC-SCNC: 4 MMOL/L — LOW (ref 5–17)
APPEARANCE UR: CLEAR — SIGNIFICANT CHANGE UP
APTT BLD: 27.8 SEC — SIGNIFICANT CHANGE UP (ref 27.5–35.5)
AST SERPL-CCNC: 43 U/L — HIGH (ref 15–37)
BACTERIA # UR AUTO: ABNORMAL
BASOPHILS # BLD AUTO: 0.01 K/UL — SIGNIFICANT CHANGE UP (ref 0–0.2)
BASOPHILS NFR BLD AUTO: 0.1 % — SIGNIFICANT CHANGE UP (ref 0–2)
BILIRUB SERPL-MCNC: 0.4 MG/DL — SIGNIFICANT CHANGE UP (ref 0.2–1.2)
BILIRUB UR-MCNC: NEGATIVE — SIGNIFICANT CHANGE UP
BUN SERPL-MCNC: 13 MG/DL — SIGNIFICANT CHANGE UP (ref 7–23)
CALCIUM SERPL-MCNC: 9.7 MG/DL — SIGNIFICANT CHANGE UP (ref 8.5–10.1)
CHLORIDE SERPL-SCNC: 108 MMOL/L — SIGNIFICANT CHANGE UP (ref 96–108)
CO2 SERPL-SCNC: 29 MMOL/L — SIGNIFICANT CHANGE UP (ref 22–31)
COLOR SPEC: YELLOW — SIGNIFICANT CHANGE UP
CREAT SERPL-MCNC: 0.99 MG/DL — SIGNIFICANT CHANGE UP (ref 0.5–1.3)
DIFF PNL FLD: ABNORMAL
EGFR: 60 ML/MIN/1.73M2 — SIGNIFICANT CHANGE UP
EOSINOPHIL # BLD AUTO: 0.02 K/UL — SIGNIFICANT CHANGE UP (ref 0–0.5)
EOSINOPHIL NFR BLD AUTO: 0.3 % — SIGNIFICANT CHANGE UP (ref 0–6)
EPI CELLS # UR: SIGNIFICANT CHANGE UP
GLUCOSE SERPL-MCNC: 116 MG/DL — HIGH (ref 70–99)
GLUCOSE UR QL: NEGATIVE MG/DL — SIGNIFICANT CHANGE UP
HCT VFR BLD CALC: 44.2 % — SIGNIFICANT CHANGE UP (ref 34.5–45)
HGB BLD-MCNC: 14.1 G/DL — SIGNIFICANT CHANGE UP (ref 11.5–15.5)
IMM GRANULOCYTES NFR BLD AUTO: 0.1 % — SIGNIFICANT CHANGE UP (ref 0–0.9)
INR BLD: 0.99 RATIO — SIGNIFICANT CHANGE UP (ref 0.88–1.16)
KETONES UR-MCNC: NEGATIVE — SIGNIFICANT CHANGE UP
LACTATE SERPL-SCNC: 0.9 MMOL/L — SIGNIFICANT CHANGE UP (ref 0.7–2)
LEUKOCYTE ESTERASE UR-ACNC: ABNORMAL
LIDOCAIN IGE QN: 134 U/L — SIGNIFICANT CHANGE UP (ref 73–393)
LYMPHOCYTES # BLD AUTO: 1.71 K/UL — SIGNIFICANT CHANGE UP (ref 1–3.3)
LYMPHOCYTES # BLD AUTO: 24.2 % — SIGNIFICANT CHANGE UP (ref 13–44)
MCHC RBC-ENTMCNC: 27.7 PG — SIGNIFICANT CHANGE UP (ref 27–34)
MCHC RBC-ENTMCNC: 31.9 G/DL — LOW (ref 32–36)
MCV RBC AUTO: 86.8 FL — SIGNIFICANT CHANGE UP (ref 80–100)
MONOCYTES # BLD AUTO: 0.32 K/UL — SIGNIFICANT CHANGE UP (ref 0–0.9)
MONOCYTES NFR BLD AUTO: 4.5 % — SIGNIFICANT CHANGE UP (ref 2–14)
NEUTROPHILS # BLD AUTO: 4.99 K/UL — SIGNIFICANT CHANGE UP (ref 1.8–7.4)
NEUTROPHILS NFR BLD AUTO: 70.8 % — SIGNIFICANT CHANGE UP (ref 43–77)
NITRITE UR-MCNC: NEGATIVE — SIGNIFICANT CHANGE UP
NRBC # BLD: 0 /100 WBCS — SIGNIFICANT CHANGE UP (ref 0–0)
NT-PROBNP SERPL-SCNC: 129 PG/ML — HIGH (ref 0–125)
PH UR: 6 — SIGNIFICANT CHANGE UP (ref 5–8)
PLATELET # BLD AUTO: 164 K/UL — SIGNIFICANT CHANGE UP (ref 150–400)
POTASSIUM SERPL-MCNC: 4.4 MMOL/L — SIGNIFICANT CHANGE UP (ref 3.5–5.3)
POTASSIUM SERPL-SCNC: 4.4 MMOL/L — SIGNIFICANT CHANGE UP (ref 3.5–5.3)
PROT SERPL-MCNC: 8 GM/DL — SIGNIFICANT CHANGE UP (ref 6–8.3)
PROT UR-MCNC: 15 MG/DL
PROTHROM AB SERPL-ACNC: 11.8 SEC — SIGNIFICANT CHANGE UP (ref 10.5–13.4)
RBC # BLD: 5.09 M/UL — SIGNIFICANT CHANGE UP (ref 3.8–5.2)
RBC # FLD: 12.4 % — SIGNIFICANT CHANGE UP (ref 10.3–14.5)
RBC CASTS # UR COMP ASSIST: ABNORMAL /HPF (ref 0–4)
SODIUM SERPL-SCNC: 141 MMOL/L — SIGNIFICANT CHANGE UP (ref 135–145)
SP GR SPEC: 1.02 — SIGNIFICANT CHANGE UP (ref 1.01–1.02)
TROPONIN I, HIGH SENSITIVITY RESULT: 11.6 NG/L — SIGNIFICANT CHANGE UP
UROBILINOGEN FLD QL: NEGATIVE MG/DL — SIGNIFICANT CHANGE UP
WBC # BLD: 7.06 K/UL — SIGNIFICANT CHANGE UP (ref 3.8–10.5)
WBC # FLD AUTO: 7.06 K/UL — SIGNIFICANT CHANGE UP (ref 3.8–10.5)
WBC UR QL: ABNORMAL

## 2023-02-27 PROCEDURE — 71045 X-RAY EXAM CHEST 1 VIEW: CPT | Mod: 26

## 2023-02-27 PROCEDURE — 93010 ELECTROCARDIOGRAM REPORT: CPT

## 2023-02-27 RX ORDER — AMLODIPINE BESYLATE 2.5 MG/1
1 TABLET ORAL
Qty: 0 | Refills: 0 | DISCHARGE

## 2023-02-27 RX ORDER — SODIUM CHLORIDE 9 MG/ML
1000 INJECTION INTRAMUSCULAR; INTRAVENOUS; SUBCUTANEOUS ONCE
Refills: 0 | Status: COMPLETED | OUTPATIENT
Start: 2023-02-27 | End: 2023-02-27

## 2023-02-27 RX ORDER — CEFTRIAXONE 500 MG/1
1000 INJECTION, POWDER, FOR SOLUTION INTRAMUSCULAR; INTRAVENOUS ONCE
Refills: 0 | Status: COMPLETED | OUTPATIENT
Start: 2023-02-27 | End: 2023-02-27

## 2023-02-27 RX ORDER — METOPROLOL TARTRATE 50 MG
1 TABLET ORAL
Qty: 0 | Refills: 0 | DISCHARGE

## 2023-02-27 RX ORDER — CEFPODOXIME PROXETIL 100 MG
1 TABLET ORAL
Qty: 20 | Refills: 0
Start: 2023-02-27 | End: 2023-03-08

## 2023-02-27 RX ADMIN — CEFTRIAXONE 100 MILLIGRAM(S): 500 INJECTION, POWDER, FOR SOLUTION INTRAMUSCULAR; INTRAVENOUS at 05:24

## 2023-02-27 RX ADMIN — SODIUM CHLORIDE 1000 MILLILITER(S): 9 INJECTION INTRAMUSCULAR; INTRAVENOUS; SUBCUTANEOUS at 05:24

## 2023-02-27 RX ADMIN — CEFTRIAXONE 1000 MILLIGRAM(S): 500 INJECTION, POWDER, FOR SOLUTION INTRAMUSCULAR; INTRAVENOUS at 05:54

## 2023-02-27 NOTE — ED PROVIDER NOTE - OBJECTIVE STATEMENT
72F PMHx of pernicious anemia, vitamin b12 deficiency, presenting with "shaking" episode today. Describes sitting on bed and began shaking b/l arms. Lasted few minutes in duration. No postictal period or confusion. No previous episodes. Denies any chest pain, abdominal pain, shortness of breath, nausea/vomiting, headaches, fevers, chills, diarrhea ,constipation, weakness, syncope, hematuria, dysuria, urinary symptoms, subjective neurological deficits, coughs, sore throat, sick contacts, new medications.

## 2023-02-27 NOTE — ED PROVIDER NOTE - NSFOLLOWUPINSTRUCTIONS_ED_ALL_ED_FT
Urinary Tract Infection    A urinary tract infection (UTI) is an infection of any part of the urinary tract, which includes the kidneys, ureters, bladder, and urethra. Risk factors include ignoring your need to urinate, wiping back to front if female, being an uncircumcised male, and having diabetes or a weak immune system. Symptoms include frequent urination, pain or burning with urination, foul smelling urine, cloudy urine, pain in the lower abdomen, blood in the urine, and fever. If you were prescribed an antibiotic medicine, take it as told by your health care provider. Do not stop taking the antibiotic even if you start to feel better.    SEEK IMMEDIATE MEDICAL CARE IF YOU HAVE ANY OF THE FOLLOWING SYMPTOMS: severe back or abdominal pain, fever, inability to keep fluids or medicine down, dizziness/lightheadedness, or a change in mental status.     Take acetaminophen 650 mg orally every 6-8 hours for pain control as needed. Please do not exceed 4,000 mg of acetaminophen during a 24 hours period. Acetaminophen can be found in many over-the-counter cold medications as well as opioid medications that may be given for pain.    Take ibuprofen (also known as MOTRIN or ADVIL) 400 mg orally every 6-8 hours for pain control as needed with food to avoid an upset stomach. Ibuprofen can be found in many over-the-counter medications. Please do not take ibuprofen if you have a bleeding disorder, stomach or gastrointestinal ulcer, or liver disease.    If needed, you can alternate these medications so that you can take one medication every 3 hours. For example, at noon take ibuprofen, then at 3PM take acetaminophen, then at 6PM take ibuprofen.     Please fill the prescription of the antibiotics and take as directed. Please finish the entire course of the medication as prescribed. Do not use any alcohol or grapefruit juice with any antibiotics.     Rest, drink plenty of fluids.  Advance activity as tolerated.  Continue all previously prescribed medications as directed.  Follow up with your PMD 2-3 days and bring copies of your results.  Return to the ER for worsening symptoms,

## 2023-02-27 NOTE — ED PROVIDER NOTE - CLINICAL SUMMARY MEDICAL DECISION MAKING FREE TEXT BOX
Pt w/ PMHx of pernicious anemia, vit b12 def p/w "shaking episode"  x few minutes.   Exam is normal, benign, neuro intact. cerebellar intact ftn/hts b/l  DDx includes: rule out sepsis, although afebrile and lactate normal.   PLAN:  - CBC, CMP,   UA, lactate normal, trop negative, CXR normal, UA negative.  - Re-evaluation and disposition accordingly.

## 2023-02-27 NOTE — ED PROVIDER NOTE - NSDCPRINTRESULTS_ED_ALL_ED
R3 Nephrology Consult      Patient: Ratikanta Chawada               Sex: female            MRN:  [id number]      YOB: 1940      Age:  80 year old           DOA: 12/31/2020     Inpatient consult to Nephrology  Consult performed by: Natan Jimenez DO  Consult ordered by: Génesis Barnett MD        Subjective:    Ratikanta Chawada is a 80 year old female who is being seen for Hyponatremia.    80-year-old female hx of CKD 3 baseline creatinine 1.3, CAD status post PCI, DM, HTN/HLD, presented on 12/31/2020 complaining of headaches and chest pain found to be in hypertensive emergency also noted hyponatremia for which nephrology was consulted.  Patient poor historian history obtained via son.  Pt has known kidney disease but does not follow with nephrology, apparently saw someone at [facility] several years prior.  Patient was in pain from hip fracture taking Tylenol when awoke with sudden onset chest pain.  Patient also complained of headache as well.  Denied history of fevers chills.  Son describes coughing and inability to swallow food and pills.  Denies decreased fluid intake.    In the ED patient afebrile bradycardic 47 hypertensive 191/56 saturating well on room air.  Labs significant for hyponatremia 123 BUN/creatinine at baseline 27, 1.3 respectively.  NT proBNP 29,000 troponin 0.11 which down trended and leukocytosis 15 normocytic anemia 9.  CXR showed right-sided pneumonia.  Patient received aspirin starting heparin drip.  1 dose of atropine given.  Ceftriaxone azithromycin and doxycycline given.  Diuresed with 20 mg Lasix IVP and given 1 sublingual nitro tab.     History obtained from chart records, patient, and family.    Past Medical History:   Diagnosis Date   • CAD (coronary artery disease)    • Diabetes mellitus, type 2 (CMS/HCC)    • Dyslipidemia    • Hypertension      Past Surgical History:   Procedure Laterality Date   • Coronary stent placement     • Leg surgery        Family History   Problem  Relation Age of Onset   • Diabetes Mother    • Diabetes Father    • Diabetes Sister    • Diabetes Brother    • Diabetes Daughter    • Diabetes Son      Social History     Tobacco Use   • Smoking status: Never Smoker   • Smokeless tobacco: Never Used   Substance Use Topics   • Alcohol use: Never     Frequency: Never      Current Facility-Administered Medications   Medication Dose Route Frequency Provider Last Rate Last Admin   • heparin (porcine) 25,000 units/250 mL in dextrose 5 % infusion  0-30 Units/kg/hr (Dosing Weight) Intravenous Continuous Robinson Baker DO 6.8 mL/hr at 12/31/20 0643 12 Units/kg/hr at 12/31/20 0643   • heparin (porcine) injection 3,400 Units  60 Units/kg (Dosing Weight) Intravenous PRN Robinson Baker, DO       • heparin (porcine) injection 1,700 Units  30 Units/kg (Dosing Weight) Intravenous PRN Robinson Baker DO       • atorvastatin (LIPITOR) tablet 80 mg  80 mg Oral Nightly Roe Lynne MD   80 mg at 12/31/20 0858   • aspirin chewable 324 mg  324 mg Oral Once Robinson Baker DO       • [START ON 1/1/2021] aspirin chewable 81 mg  81 mg Oral Daily Roe Lynne MD       • hydrALAZINE (APRESOLINE) tablet 25 mg  25 mg Oral Q6H PRN Aleksandra Shaikh MD   25 mg at 12/31/20 0858   • cefepime (MAXIPIME) 1,000 mg in sodium chloride 0.9 % 100 mL IVPB  1,000 mg Intravenous 2 times per day Génesis Barnett MD          Prior to Admission medications    Medication Sig Start Date End Date Taking? Authorizing Provider   DULoxetine (CYMBALTA) 20 MG capsule Take 20 mg by mouth 2 times daily. 0900/2100   Yes Outside Provider   polyethylene glycol (MIRALAX) 17 g packet Take 17 g by mouth daily as needed. Indications: Constipation Stir and dissolve powder in any 4 to 8 ounces of beverage, then drink.   Yes Outside Provider   clopidogrel (PLAVIX) 75 MG tablet Take 75 mg by mouth daily.   Yes Outside Provider   vitamin B-12 (CYANOCOBALAMIN) 1000 MCG tablet Take 1,000 mcg by mouth daily.   Yes Outside Provider   insulin regular  70-30 (HUMULIN 70/30) (70-30) 100 UNIT/ML injectable suspension Inject 20 units before breakfast and 16 units before dinner 5/30/19  Yes Herlinda Beckman MD   ferrous sulfate 325 (65 FE) MG tablet Take 1 tablet by mouth 2 times daily.    Yes Outside Provider   docusate sodium 100 MG tablet Take 100 mg by mouth.   Yes Outside Provider   carvedilol (COREG) 6.25 MG tablet Take 3.125 mg by mouth 2 times daily (with meals).    Yes Outside Provider   atorvastatin (LIPITOR) 40 MG tablet Take 40 mg by mouth.   Yes Outside Provider   aspirin 81 MG EC tablet    Yes Outside Provider   sucralfate (CARAFATE) 1 g tablet Take 1 g by mouth 2 times daily. Breakfast/dinner    Outside Provider   Glucose Blood (COOL BLOOD GLUCOSE TEST STRIPS VI)   See Instructions, to test 3 times a week, # 30 BOX, 3 Refill(s), 05/08/17 14:46:51, Pharmacy: [address], Supply, to test 3 times a week 5/8/17   Outside Provider   ciprofloxacin (CIPRO) 0.3 % ophthalmic solution   10 mL, 2 GTT To Right Ear x 5 days BID, 1, 06/27/17 12:20:33, Maintenance, 1, Constant Indicator, See Instructions, Pharmacy: [facility] 6/27/17   Outside Provider   furosemide (LASIX) 20 MG tablet Take 20 mg by mouth. 7/3/18   Outside Provider   Insulin Pen Needle (BD PEN NEEDLE CELINA U/F) 32G X 4 MM Misc Inject as directed 12/15/17   Outside Provider   isosorbide dinitrate (ISORDIL) 30 MG tablet Take 30 mg by mouth daily.     Outside Provider   prednisoLONE sod-phos (INFLAMASE FORTE) 1 % ophthalmic solution   See Instructions, 2 GTT to Right Ear x 5 days, BID, # 10 mL, 1 Refill(s), 06/27/17 12:20:00, Pharmacy: [facility] 6/27/17   Outside Provider   glimepiride (AMARYL) 1 MG tablet Take 0.5 mg by mouth. 12/15/17   Outside Provider   pantoprazole (PROTONIX) 20 MG tablet     Outside Provider      ALLERGIES:  No Known Allergies    Review of Systems:  Review of Systems   Unable to perform ROS: Other       Objective:     Visit  Vitals  BP (!) 178/66 (BP Location: LUE - Left upper extremity) Comment (BP Location): textpaged Dr. Lagunas re: both BPs 1100/1105   Pulse (!) 45   Temp 97.5 °F (36.4 °C) (Oral)   Resp 15   Ht 5' 2\" (1.575 m)   Wt 61 kg (134 lb 7.7 oz)   SpO2 97%   BMI 24.60 kg/m²      No intake/output data recorded.    Physical Exam:  Physical Exam   Constitutional: She is oriented to person, place, and time. She appears well-developed and well-nourished. No distress.   HENT:   Head: Normocephalic and atraumatic.   Right Ear: External ear normal.   Left Ear: External ear normal.   Mouth/Throat: Oropharynx is clear and moist. No oropharyngeal exudate.   Eyes: Pupils are equal, round, and reactive to light. EOM are normal. Right eye exhibits no discharge. Left eye exhibits no discharge.   Neck: Normal range of motion. Neck supple. No tracheal deviation present. No thyromegaly present.   Cardiovascular: Normal rate, regular rhythm and normal heart sounds. Exam reveals no gallop and no friction rub.   No murmur heard.  No JVD noted   Pulmonary/Chest: Effort normal. No respiratory distress. She has no wheezes. She has rales (L>R to mid lung).   Abdominal: Soft. Bowel sounds are normal. She exhibits no distension and no mass. There is no abdominal tenderness. There is no rebound and no guarding.   Musculoskeletal: Normal range of motion.         General: Edema (+1 pitting ankle b/l) present. No deformity.   Lymphadenopathy:     She has no cervical adenopathy.   Neurological: She is alert and oriented to person, place, and time. She has normal reflexes. No cranial nerve deficit. She exhibits normal muscle tone. Coordination normal.   Skin: Skin is warm and dry. No rash noted. She is not diaphoretic. No erythema.   Psychiatric: She has a normal mood and affect. Her behavior is normal. Judgment and thought content normal.   Nursing note and vitals reviewed.      Lab/Data Reviewed:  CBC:   Recent Labs   Lab 12/31/20  0533 12/31/20  3922    WBC 13.1* 14.7*   RBC 3.01* 3.23*   HGB 8.2* 9.0*   HCT 25.2* 27.1*   MCV 83.7 83.9   MCHC 32.5 33.2   RDW-CV 13.3 13.2    363   Lymphocytes, Percent  --  15     CMP:  Recent Labs   Lab 20  0402   SODIUM 123*   POTASSIUM 4.4   CHLORIDE 94*   CO2 22   BUN 27*   CREATININE 1.32*   GLUCOSE 129*   CALCIUM 8.7   ALBUMIN 2.9*   MG 2.0   BILIRUBIN 0.4   ALKPT 177*   AST 45*   GPT 57     UA  Lab Results   Component Value Date    UWBC MODERATE (A) 2018    URBC NEGATIVE 2018      Coags  Recent Labs   Lab 20  0533   PT 10.7   PTT 28   INR 1.0       Imaging  TRANSTHORACIC ECHO (TTE) COMPLETE W/ W/O IMAGING AGENT  *[facility]*  [facility] [place] [address]  Transthoracic Echocardiogram (TTE)    Patient: Chawada, Ratikanta    Study Date/Time:    Dec 31 2020 7:42AM  MRN:     [id number]               FIN#:               [id number]  :     1940            Ht/Wt:              152.4cm 60.8kg  Age:     80                    BSA/BMI:            1.57m^2 26.2kg/m^2  Gender:  F                     Baseline BP:        177 / 63     Referring Physician:  Roe Lynne     Attending Physician:  Aleksandra Shaikh  Performing Physician: Ciaar Merlos MD  Diagnostic Physician: Ciara Merlos MD  Sonographer:          Mel Gonzales     Fellow:               Meliza Mustafa MD     --------------------------------------------------------------------------  INDICATIONS:   Congestive heart failure. Sob.    --------------------------------------------------------------------------  STUDY CONCLUSIONS  SUMMARY:    1. Left ventricle: The cavity size is normal. The ejection fraction was     measured by biplane method of disks. The ejection fraction is 55%.  2. Aortic valve: Mild regurgitation directed centrally in the LVOT.  3. Mitral valve: Trivial regurgitation.  4. Right ventricle: The cavity size is dilated. Systolic function is     mildly reduced.  Systolic pressure is increased. The estimated peak     pressure is 52mm Hg.  5. Tricuspid valve: Mild regurgitation.  6. Inferior vena cava: The vessel is dilated. The respirophasic diameter     changes are blunted (less than 50%).    --------------------------------------------------------------------------  STUDY DATA:   Procedure:  Transthoracic echocardiography was performed.  Image quality was adequate.  M-mode, complete 2D, complete spectral  Doppler, and color Doppler.  Study status:  Routine.  Study completion:  There were no complications.    FINDINGS    LEFT VENTRICLE:  The cavity size is normal.  Left ventricular geometry  shows evidence of eccentric hypertrophy, with increased ventricular mass  and normal relative wall thickness. Systolic function is at the lower  limits of normal. Wall motion is normal; there are no regional wall motion  abnormalities. Unable to accurately assess left ventricular diastolic  function parameters due to heart block.    The ejection fraction was  measured by biplane method of disks. The ejection fraction is 55%. The  tissue Doppler parameters are abnormal.    AORTIC VALVE:   The valve is trileaflet. The leaflets are mildly thickened  and mildly calcified. Cusp separation is normal. Mobility is not  restricted.  Doppler:  Transvalvular velocity is within the normal range.  There is no stenosis.  Mild regurgitation directed centrally in the LVOT.    The LVOT to aortic valve VTI ratio is 0.55. The valve area by the  velocity-time integral method is 1.1cm^2. The valve area index by the  velocity-time integral method is 0.71cm^2/m^2. The ratio of LVOT to aortic  valve peak velocity is 0.7. The valve area by the peak velocity method is  1.4cm^2. The valve area index by the peak velocity method is 0.9cm^2/m^2.    The mean systolic gradient is 4mm Hg. The peak systolic gradient is 7mm  Hg.    AORTA:  Aortic root: The aortic root is normal in size.    MITRAL VALVE:  The annulus is  mildly calcified. The leaflets are mildly  thickened. Leaflet separation is normal. Mobility is not restricted. No  echocardiographic evidence for prolapse.  Doppler:  Transvalvular velocity  is within the normal range. There is no evidence for stenosis.  Trivial  regurgitation.    The valve area by pressure half-time is 4.3cm^2. The  valve area index by pressure half-time is 2.75cm^2/m^2.    The peak  diastolic gradient is 5mm Hg.    LEFT ATRIUM:  The atrium is normal in size.    RIGHT VENTRICLE:  The cavity size is dilated. Systolic function is mildly  reduced. Systolic pressure is increased. The estimated peak pressure is  52mm Hg.    VENTRICULAR SEPTUM:   Thickness is increased.    PULMONIC VALVE:    Structurally normal valve.    Doppler:   Trivial  regurgitation.    TRICUSPID VALVE:  The leaflets are mildly thickened. Leaflet separation is  normal.  Doppler:  Transvalvular velocity is within the normal range.  There is no evidence for stenosis.  Mild regurgitation.    RIGHT ATRIUM:  The atrium is at the upper limits of normal in size.  Eustachian valve noted.    PERICARDIUM:  The pericardium is normal in appearance.    SYSTEMIC VEINS:  Inferior vena cava: The vessel is dilated. The respirophasic diameter  changes are blunted (less than 50%).    --------------------------------------------------------------------------  Measurements     Left ventricle               Value         Ref          Right ventricle            Value          Ref   SUKHWINDER major ax, A4C            5.8   cm      -----------  TAPSE, MM                  2.1   cm       1.7 - 3.1   ESD major ax, A4C            5.1   cm      -----------  Pressure, S                52    mm Hg    ---------   FS major axis, A4C           11    %       -----------   SUKHWINDER/bsa major ax, A4C        3.7   cm/m^2  -----------  Left atrium                Value          Ref   ESD/bsa major ax, A4C        3.3   cm/m^2  -----------  SI dim, A4C                3.9   cm        ---------   DAVID, A4C                     16.7  cm^2    -----------  Area ES, A4C               13    cm^2     <=20   KIMBERLY, A4C                     11.1  cm^2    -----------  Vol, ES, 1-p A4C           27    ml       22 - 52   FAC, A4C                     34    %       -----------  Vol/bsa, ES, 1-p A4C       17    ml/m^2   11 - 40   EF                           55    %       54 - 74      AP dim, ES MM        (H)   3.9   cm       2.7 - 3.8   SV                           45    ml      -----------  AP dim index, ES MM  (H)   2.5   cm/m^2   1.5 - 2.3   SV/bsa                       29    ml/m^2  -----------   ESV, 1-p A4C                 15    ml      12 - 60      Aortic valve               Value          Ref   SV, 1-p A4C                  19    ml      -----------  Leaflet sep, MM            1.7   cm       ---------   ESV/bsa, 1-p A4C             9     ml/m^2  7 - 35       Peak v, S                  1.3   m/sec    ---------   SV/bsa, 1-p A4C              12    ml/m^2  -----------  Mean v, S                  1.01  m/sec    ---------   EDV, 2-p              (L)    39    ml      46 - 106     Mean grad, S               4     mm Hg    ---------   ESV, 2-p                     19    ml      14 - 42      Peak grad, S               7     mm Hg    ---------   SV, 2-p                      20    ml      -----------  LVOT/AV, VTI ratio         0.55           ---------   EDV/bsa, 2-p          (L)    25    ml/m^2  29 - 61      CASS, VTI                   1.1   cm^2     ---------   ESV/bsa, 2-p                 12    ml/m^2  8 - 24       CASS/bsa, VTI               0.71  cm^2/m^2 ---------   SV/bsa, 2-p                  12.9  ml/m^2  -----------  LVOT/AV, Vpeak ratio       0.7            ---------   SUKHWINDER, MM                      5.0   cm      3.8 - 5.2    CASS, Vmax                  1.4   cm^2     ---------   ESD, MM                      3.0   cm      2.2 - 3.5    CASS/bsa, Vmax              0.9   cm^2/m^2 ---------   SUKHWINDER/bsa, MM           (H)     3.2   cm/m^2  2.3 - 3.1    AR ED v                    2.76  m/s      ---------   ESD/bsa, MM                  1.9   cm/m^2  1.3 - 2.1    AR decel                   143   cm/s^2   ---------   Mid-wall FS, MM              18    %       15 - 23      AR PHT                     567   ms       ---------   PW, ED MM                    0.9   cm      0.6 - 0.9    AR ED grad                 30    mm Hg    ---------   PW/ID ratio, ED MM           0.18          -----------   IVS/PW ratio, ED MM          1.22          -----------  Mitral valve               Value          Ref   Rel thickness, ED MM         0.36          0.22 - 0.42  Peak E                     1.16  m/sec    ---------   EDV, MM Teich.        (H)    135   ml      56 - 104     Peak A                     0.87  m/sec    ---------   ESV, MM Teich.               40    ml      19 - 49      Decel time                 169   ms       ---------   EDV/bsa, MM Teich.    (H)    86    ml/m^2  35 - 75      PHT                        51    ms       ---------   ESV/bsa, MM Teich.           25    ml/m^2  12 - 30      Peak grad, D               5     mm Hg    ---------   Mass, MM              (H)    175   g       67 - 162     Peak E/A ratio             1.3            ---------   Mass/bsa, MM          (H)    112   g/m^2   43 - 95      MVA, PHT                   4.3   cm^2     ---------   Mass/ht, MM           (H)    115   g/m     41 - 99      MVA/bsa, PHT               2.75  cm^2/m^2 ---------   Mass/ht^2.7, MM              56    g/m^2.7 -----------   IVRT                         71    ms      -----------  Tricuspid valve            Value          Ref   E', lat ang, TDI      (L)    8.92  cm/sec  >=10         TR peak v            (H)   3.1   m/sec    <=2.8   E/e', lat ang TDI           13            -----------  Peak RV-RA grad, S         37    mm Hg    ---------   E', med ang TDI      (L)    6.09  cm/sec  >=7          Max TR wilfred                 3.06  m/sec    ---------   E/e',  med ang, TDI           19            -----------   E', avg, TDI                 7.505 cm/sec  -----------  Aortic root                Value          Ref   E/e', avg, TDI        (H)    15            <=14         Root diam, ED              2.7   cm       <3.8      LVOT                         Value         Ref          Pulmonary artery           Value          Ref   Diam, S                      1.6   cm      -----------  Pressure, S                52    mm Hg    ---------   Area                         2.0   cm^2    -----------   Peak wilfred, S                  0.92  m/sec   -----------  Systemic veins             Value          Ref   Peak grad, S                 3     mm Hg   -----------  Estimated CVP              15    mm Hg    ---------      Ventricular septum           Value         Ref   IVS, ED MM            (H)    1.1   cm      0.6 - 0.9  Legend:  (L)  and  (H)  fermin values outside specified reference range.    Prepared and electronically signed by  Ciara Merlos MD  12/31/2020 10:00  XR CHEST PA OR AP 1 VIEW  Narrative: EXAM: XR CHEST PA OR AP 1 VIEW    HISTORY: Dyspnea    TECHNIQUE: Single AP chest x-ray.    COMPARISON: 11/18/2019    FINDINGS:    Heart and Mediastinum: Stable.    Pulmonary vessels: Normal.    Lungs: Consolidation in the RIGHT mid and lower lung with probable right-sided pleural fluid.  LEFT lung appears clear.   No pneumothorax identified.    Support devices: Stable.  Impression:  Right-sided pneumonia.    Electronically Signed by: JEREMY MUNSON M.D.   Signed on: 12/31/2020 4:57 AM        Assessment/Plan   1. Hyponatremia hypervolemic, possibly decreased salt intake with dysphagia  -Will check urine studies including urinalysis, urine sodium, urine creatinine, and urine urea  -Rec diuresis to treat underlying heart failure, got 20 mg Lasix, will follow response   -BMP the evening   -If downtrend suspect will need tolvaptan  2. CKD 3-baseline creatinine 1.3  -This is  stable  3. Hypertensive emergency -elevated troponin, increased RV pressure  -cardiology on consult  4. Right sided pneumonia, suspect aspiration   -Cefepime  5. Normocytic anemia unclear etiology  6. RV Failure/Bradycardia 1-2 AV Block - ?RCA lesion   -Cardiolog on consult, likely cath vs stress test  7. CAD/HTN/HLD          Please see attending addendum for final recommendations.    Natan Jimenez, DO PGY3  12/31/2020  12:43 PM    Note dictated with the use of Dragon voice recognition system, therefore errors in content may be related to failure of voice recognition system.     Patient requests all Lab, Cardiology, and Radiology Results on their Discharge Instructions

## 2023-02-27 NOTE — ED PROVIDER NOTE - PATIENT PORTAL LINK FT
You can access the FollowMyHealth Patient Portal offered by [facility] by registering at the following website: [email or web address]. By joining [employer]’s FollowMyHealth portal, you will also be able to view your health information using other applications (apps) compatible with our system.

## 2023-02-28 LAB
CULTURE RESULTS: SIGNIFICANT CHANGE UP
SPECIMEN SOURCE: SIGNIFICANT CHANGE UP

## 2023-07-21 ENCOUNTER — EMERGENCY (EMERGENCY)
Facility: HOSPITAL | Age: 73
LOS: 0 days | Discharge: HOME | End: 2023-07-22
Attending: STUDENT IN AN ORGANIZED HEALTH CARE EDUCATION/TRAINING PROGRAM
Payer: MEDICARE

## 2023-07-21 VITALS
HEIGHT: 59 IN | TEMPERATURE: 99 F | WEIGHT: 123.9 LBS | SYSTOLIC BLOOD PRESSURE: 166 MMHG | HEART RATE: 64 BPM | DIASTOLIC BLOOD PRESSURE: 83 MMHG | OXYGEN SATURATION: 98 % | RESPIRATION RATE: 19 BRPM

## 2023-07-21 DIAGNOSIS — M79.671 PAIN IN RIGHT FOOT: ICD-10-CM

## 2023-07-21 DIAGNOSIS — Z88.8 ALLERGY STATUS TO OTHER DRUGS, MEDICAMENTS AND BIOLOGICAL SUBSTANCES: ICD-10-CM

## 2023-07-21 DIAGNOSIS — I10 ESSENTIAL (PRIMARY) HYPERTENSION: ICD-10-CM

## 2023-07-21 DIAGNOSIS — Z00.8 ENCOUNTER FOR OTHER GENERAL EXAMINATION: ICD-10-CM

## 2023-07-21 PROCEDURE — 99053 MED SERV 10PM-8AM 24 HR FAC: CPT

## 2023-07-21 PROCEDURE — 99284 EMERGENCY DEPT VISIT MOD MDM: CPT

## 2023-07-22 VITALS
OXYGEN SATURATION: 98 % | SYSTOLIC BLOOD PRESSURE: 160 MMHG | DIASTOLIC BLOOD PRESSURE: 81 MMHG | RESPIRATION RATE: 18 BRPM | HEART RATE: 60 BPM

## 2023-07-22 LAB
ALBUMIN SERPL ELPH-MCNC: 3.8 G/DL — SIGNIFICANT CHANGE UP (ref 3.3–5)
ALP SERPL-CCNC: 98 U/L — SIGNIFICANT CHANGE UP (ref 40–120)
ALT FLD-CCNC: 34 U/L — SIGNIFICANT CHANGE UP (ref 12–78)
ANION GAP SERPL CALC-SCNC: 5 MMOL/L — SIGNIFICANT CHANGE UP (ref 5–17)
AST SERPL-CCNC: 30 U/L — SIGNIFICANT CHANGE UP (ref 15–37)
BASOPHILS # BLD AUTO: 0.02 K/UL — SIGNIFICANT CHANGE UP (ref 0–0.2)
BASOPHILS NFR BLD AUTO: 0.3 % — SIGNIFICANT CHANGE UP (ref 0–2)
BILIRUB SERPL-MCNC: 0.4 MG/DL — SIGNIFICANT CHANGE UP (ref 0.2–1.2)
BUN SERPL-MCNC: 12 MG/DL — SIGNIFICANT CHANGE UP (ref 7–23)
CALCIUM SERPL-MCNC: 9.6 MG/DL — SIGNIFICANT CHANGE UP (ref 8.5–10.1)
CHLORIDE SERPL-SCNC: 108 MMOL/L — SIGNIFICANT CHANGE UP (ref 96–108)
CO2 SERPL-SCNC: 29 MMOL/L — SIGNIFICANT CHANGE UP (ref 22–31)
CREAT SERPL-MCNC: 0.83 MG/DL — SIGNIFICANT CHANGE UP (ref 0.5–1.3)
EGFR: 74 ML/MIN/1.73M2 — SIGNIFICANT CHANGE UP
EOSINOPHIL # BLD AUTO: 0.14 K/UL — SIGNIFICANT CHANGE UP (ref 0–0.5)
EOSINOPHIL NFR BLD AUTO: 2 % — SIGNIFICANT CHANGE UP (ref 0–6)
GLUCOSE SERPL-MCNC: 118 MG/DL — HIGH (ref 70–99)
HCT VFR BLD CALC: 43.6 % — SIGNIFICANT CHANGE UP (ref 34.5–45)
HGB BLD-MCNC: 13.9 G/DL — SIGNIFICANT CHANGE UP (ref 11.5–15.5)
IMM GRANULOCYTES NFR BLD AUTO: 0.1 % — SIGNIFICANT CHANGE UP (ref 0–0.9)
LYMPHOCYTES # BLD AUTO: 1.65 K/UL — SIGNIFICANT CHANGE UP (ref 1–3.3)
LYMPHOCYTES # BLD AUTO: 23.2 % — SIGNIFICANT CHANGE UP (ref 13–44)
MCHC RBC-ENTMCNC: 26.9 PG — LOW (ref 27–34)
MCHC RBC-ENTMCNC: 31.9 G/DL — LOW (ref 32–36)
MCV RBC AUTO: 84.5 FL — SIGNIFICANT CHANGE UP (ref 80–100)
MONOCYTES # BLD AUTO: 0.39 K/UL — SIGNIFICANT CHANGE UP (ref 0–0.9)
MONOCYTES NFR BLD AUTO: 5.5 % — SIGNIFICANT CHANGE UP (ref 2–14)
NEUTROPHILS # BLD AUTO: 4.89 K/UL — SIGNIFICANT CHANGE UP (ref 1.8–7.4)
NEUTROPHILS NFR BLD AUTO: 68.9 % — SIGNIFICANT CHANGE UP (ref 43–77)
NRBC # BLD: 0 /100 WBCS — SIGNIFICANT CHANGE UP (ref 0–0)
PLATELET # BLD AUTO: 178 K/UL — SIGNIFICANT CHANGE UP (ref 150–400)
POTASSIUM SERPL-MCNC: 3.7 MMOL/L — SIGNIFICANT CHANGE UP (ref 3.5–5.3)
POTASSIUM SERPL-SCNC: 3.7 MMOL/L — SIGNIFICANT CHANGE UP (ref 3.5–5.3)
PROT SERPL-MCNC: 8.1 GM/DL — SIGNIFICANT CHANGE UP (ref 6–8.3)
RBC # BLD: 5.16 M/UL — SIGNIFICANT CHANGE UP (ref 3.8–5.2)
RBC # FLD: 13 % — SIGNIFICANT CHANGE UP (ref 10.3–14.5)
SODIUM SERPL-SCNC: 142 MMOL/L — SIGNIFICANT CHANGE UP (ref 135–145)
WBC # BLD: 7.1 K/UL — SIGNIFICANT CHANGE UP (ref 3.8–10.5)
WBC # FLD AUTO: 7.1 K/UL — SIGNIFICANT CHANGE UP (ref 3.8–10.5)

## 2023-07-22 NOTE — ED PROVIDER NOTE - CLINICAL SUMMARY MEDICAL DECISION MAKING FREE TEXT BOX
71 y/o F presenting to the ED for medical eval.  She currently has no symptoms.  Her vitals are stable.  Her exam is unremarkable.  Will check basic labs.    Labs WNL.  Will discharge to f/u with her PMD.

## 2023-07-22 NOTE — ED ADULT NURSE REASSESSMENT NOTE - NS ED NURSE REASSESS COMMENT FT1
Pt AOX4 with steady gait. Pt reports no acute distress at this time. Pt accepts the d.c from the MD.

## 2023-07-22 NOTE — ED PROVIDER NOTE - PATIENT PORTAL LINK FT
You can access the FollowMyHealth Patient Portal offered by [facility] by registering at the following website: [email or web address]. By joining [employer]’s FollowMyHealth portal, you will also be able to view your health information using other applications (apps) compatible with our system.

## 2023-07-22 NOTE — ED PROVIDER NOTE - PHYSICAL EXAMINATION
GENERAL: Awake, alert, NAD  HEENT: NC/AT, moist mucous membranes, PERRL, EOMI  LUNGS: CTAB, no wheezes or crackles   CARDIAC: RRR, no m/r/g  EXT: No edema, no calf tenderness, 2+ DP pulses bilaterally, no deformities.  NEURO: A&Ox3. Moving all extremities.  SKIN: Warm and dry. No rash.  PSYCH: Normal affect.

## 2023-07-22 NOTE — ED PROVIDER NOTE - OBJECTIVE STATEMENT
71 y/o F with PMH HTN presenting to the ED for foot pain. Patient states she felt "shaky" tonight and thought her blood sugar was low so she drank juice. States she did not take her blood sugar. Then, states her feet felt like they were burning. Now states she feels better and her symptoms resolved. No chest pain, dyspnea, N/V, fever, chills, or other acute symptoms.

## 2023-08-20 ENCOUNTER — EMERGENCY (EMERGENCY)
Facility: HOSPITAL | Age: 73
LOS: 0 days | Discharge: HOME | End: 2023-08-20
Attending: STUDENT IN AN ORGANIZED HEALTH CARE EDUCATION/TRAINING PROGRAM
Payer: MEDICARE

## 2023-08-20 VITALS
RESPIRATION RATE: 16 BRPM | TEMPERATURE: 98 F | WEIGHT: 125 LBS | HEIGHT: 59 IN | HEART RATE: 60 BPM | SYSTOLIC BLOOD PRESSURE: 155 MMHG | OXYGEN SATURATION: 100 % | DIASTOLIC BLOOD PRESSURE: 79 MMHG

## 2023-08-20 VITALS
RESPIRATION RATE: 18 BRPM | OXYGEN SATURATION: 99 % | SYSTOLIC BLOOD PRESSURE: 156 MMHG | TEMPERATURE: 98 F | DIASTOLIC BLOOD PRESSURE: 71 MMHG | HEART RATE: 49 BPM

## 2023-08-20 DIAGNOSIS — I10 ESSENTIAL (PRIMARY) HYPERTENSION: ICD-10-CM

## 2023-08-20 DIAGNOSIS — Z88.8 ALLERGY STATUS TO OTHER DRUGS, MEDICAMENTS AND BIOLOGICAL SUBSTANCES: ICD-10-CM

## 2023-08-20 DIAGNOSIS — G62.9 POLYNEUROPATHY, UNSPECIFIED: ICD-10-CM

## 2023-08-20 DIAGNOSIS — R20.8 OTHER DISTURBANCES OF SKIN SENSATION: ICD-10-CM

## 2023-08-20 PROCEDURE — 99283 EMERGENCY DEPT VISIT LOW MDM: CPT

## 2023-08-20 NOTE — ED PROVIDER NOTE - PHYSICAL EXAMINATION
General: No acute distress, mentation at baseline,  well nourished, well developed  HEENT: NCAT, Neck supple without meningismus, PERRL, no conjunctival injection  Lungs: CTAB, No wheeze or crackles, No retractions, No increased work of breathing  Heart: S1S2 RRR, No M/R/G, Pules equal Bilaterally in upper and lower extremities distally  Abd: soft, NT/ND, No guarding, No rebound.  No hernias, no palpable masses.  Extrem: FROM in all joints, no gross deformities appreciated, no significant edema noted, No ulcers. Cap refil < 2sec.  Skin: No rash noted, warm dry.  Neuro:   General: alert and oriented, mood and affect normal  Speech: normal, no aphasia or dysarthria  Cranial nerves: CN2-12 in tact  Peripheral exam: 5/5 motor strength in bilateral UEs and LEs, sensation intact, no pronator drift, normal finger to nose  Gait: normal with normal Romberg and tandem gait   Psychiatric: Appropriate mood and affect.

## 2023-08-20 NOTE — ED PROVIDER NOTE - PATIENT PORTAL LINK FT
You can access the FollowMyHealth Patient Portal offered by [facility] by registering at the following website: [email or web address]. By joining [facility] FollowMyHealth portal, you will also be able to view your health information using other applications (apps) compatible with our system.

## 2023-08-20 NOTE — ED ADULT NURSE NOTE - OBJECTIVE STATEMENT
Pt presents to ED c/o burning sensation in whole body x1 day. Pt was seen in ED for same complaint. Pt denies chest pain, N/V, diarrhea or SOB. Hx of HTN.

## 2023-08-20 NOTE — ED PROVIDER NOTE - OBJECTIVE STATEMENT
72-year-old femaleHypertension presenting with "burning sensation in body".  Patient has been seen here before for same complaint.  States that symptom is intermittent and has not experienced here in the ED. states that symptoms occur in hands and feet.  Denies any motor weakness, headache chest pain fever cough chills

## 2023-08-20 NOTE — ED ADULT NURSE NOTE - NSFALLUNIVINTERV_ED_ALL_ED
Bed/Stretcher in lowest position, wheels locked, appropriate side rails in place/Call bell, personal items and telephone in reach/Instruct patient to call for assistance before getting out of bed/chair/stretcher/Non-slip footwear applied when patient is off stretcher/[place] to call system/Physically safe environment - no spills, clutter or unnecessary equipment/Purposeful proactive rounding/Room/bathroom lighting operational, light cord in reach

## 2023-08-20 NOTE — ED PROVIDER NOTE - NSFOLLOWUPCLINICS_GEN_ALL_ED_FT
[facility]  Neurology  [address] [place] [address] [address]rd [place] [address]  Phone: [phone]  Fax:     [facility]  Neurology  [address] [place] [address]  Phone: [phone]  Fax: [phone]

## 2023-08-20 NOTE — ED PROVIDER NOTE - NSFOLLOWUPINSTRUCTIONS_ED_ALL_ED_FT
Please follow-up with neurologist, please  prescriptions.  If you have worsening symptoms please return to emergency department

## 2023-08-20 NOTE — ED PROVIDER NOTE - CLINICAL SUMMARY MEDICAL DECISION MAKING FREE TEXT BOX
, neurologically intact, hemodynamically stable and patient asymptomatic at this time.  Patient has been seen here before for same complaint, clinical presentation likely secondary to neuropathy.  Will send Rx and give neurology referral.  Strict return precautions given

## 2023-08-20 NOTE — ED ADULT NURSE NOTE - BREATHING, MLM
Urticaria  Benadryl as directed  Follow up with PCP in 3-5 days  Proceed to  ER if symptoms worsen  Rash in Children   WHAT YOU NEED TO KNOW:   The cause of your child's rash may not be known  You may need to keep a diary to help find what has caused your child's rash  Your child's rash may get better without treatment  DISCHARGE INSTRUCTIONS:   Call 911 if:   · Your child has trouble breathing  Return to the emergency department if:   · Your child has tiny red dots that cannot be felt and do not fade when you press them  · Your child has bruises that are not caused by injuries  · Your child feels dizzy or faints  Contact your child's healthcare provider if:   · Your child has a fever or chills  · Your child's rash gets worse or does not get better after treatment  · Your child has a sore throat, ear pain, or muscles aches  · Your child has nausea or is vomiting  · You have questions or concerns about your child's condition or care  Medicines: Your child may need any of the following:  · Antihistamines  treat rashes caused by an allergic reaction  They may also be given to decrease itchiness  · Steroids  decrease swelling, itching, and redness  Steroids can be given as a pill, shot, or cream      · Antibiotics  treat a bacterial infection  They may be given as a pill, liquid, or ointment  · Antifungals  treat a fungal infection  They may be given as a pill, liquid, or ointment  · Zinc oxide ointment  treats a rash caused by moisture  · Do not give aspirin to children under 25years of age  Your child could develop Reye syndrome if he takes aspirin  Reye syndrome can cause life-threatening brain and liver damage  Check your child's medicine labels for aspirin, salicylates, or oil of wintergreen  · Give your child's medicine as directed  Contact your child's healthcare provider if you think the medicine is not working as expected   Tell him or her if your child is allergic to any medicine  Keep a current list of the medicines, vitamins, and herbs your child takes  Include the amounts, and when, how, and why they are taken  Bring the list or the medicines in their containers to follow-up visits  Carry your child's medicine list with you in case of an emergency  Care for your child:   · Tell your child not to scratch his or her skin if it itches  Scratching can make the skin itch worse when he or she stops  Your child may also cause a skin infection by scratching  Cut your child's fingernails short to prevent scratching  Try to distract your child with games and activities  · Use thick creams, lotions, or petroleum jelly to help soothe your child's rash  Do not use any cream or lotion that has a scent or dye  · Apply cool compresses to soothe your child's skin  This may help with itching  Use a washcloth or towel soaked in cool water  Leave it on your child's skin for 10 to 15 minutes  Repeat this up to 4 times each day  · Use lukewarm water to bathe your child  Hot water can make the rash worse  You can add 1 cup of oatmeal to your child's bath to decrease itching  Ask your child's healthcare provider what kind of oatmeal to use  Pat your child's skin dry  Do not rub your child's skin with a towel  · Use detergents, soaps, shampoos, and bubble baths made for sensitive skin  Use products that do not have scents or dyes  Ask your child's healthcare provider which products are best to use  Do not use fabric softener on your child's clothes  · Dress your child in clothes made of cotton instead of nylon or wool  [address] will be softer and gentler on your child's skin  · Keep your child cool and dry in warm or hot weather  Dress your child in 1 layer of clothing in this type of weather  Keep your child out of the sun as much as possible  Use a fan or air conditioning to keep your child cool  Remove sweat and body oil with cool water  Pat the area dry  Do not apply skin ointments in warm or hot weather  · Leave your child's skin open to air without clothing as much as possible  Do this after you bathe your child or change his or her diaper  Also do this in hot or humid weather  Keep a diary of your child's rash:  A diary can help you and your child's healthcare provider find what caused your child's rash  It can also help you keep your child away from things that cause a rash  Write down any of the following that happened before the rash started:  · Foods that your child ate    · Detergents you used to wash your child's clothes    · Soaps and lotions you put on your child    · Activities your child was doing  Follow up with your child's healthcare provider as directed:  Write down your questions so you remember to ask them during your child's visits  © 2017 [facility] Information is for End User's use only and may not be sold, redistributed or otherwise used for commercial purposes  All illustrations and images included in CareNotes® are the copyrighted property of [employer] , Javier  or Rafael Jiménez  The above information is an  only  It is not intended as medical advice for individual conditions or treatments  Talk to your doctor, nurse or pharmacist before following any medical regimen to see if it is safe and effective for you  Spontaneous, unlabored and symmetrical

## 2023-10-10 NOTE — ED ADULT NURSE NOTE - CHEST APPEARANCE
normal Soolantra Pregnancy And Lactation Text: This medication is Pregnancy Category C. This medication is considered safe during breast feeding.

## 2024-04-16 NOTE — PATIENT PROFILE ADULT - SURGICAL SITE INCISION
"Daily Note     Today's date: 2024  Patient name: Kasie Kimble  : 1959  MRN: [id number]  Referring provider: Marquise Srivastava PA*  Dx:   Encounter Diagnosis     ICD-10-CM    1. Acute pain of right shoulder  M25.511       2. Right rotator cuff tendinitis  M75.81       3. Subacromial bursitis of right shoulder joint  M75.51                      Subjective: Pt reports her shld is improving, having less pain.      Objective: See treatment diary below      Assessment: Tolerated treatment well. Patient exhibited good technique with therapeutic exercises and would benefit from continued PT for con'td strengthening towards more func activities.  Pt w/ tightness in the pec responding well to MFR.      Plan: Continue per plan of care.  Progress treatment as tolerated.       Dx: R shoulder pain/ RTC tendonitis  EPOC: 24  CO-MORBIDITIES:   PERSONAL FACTORS:   Precautions: none      Manuals          R Shoulder PROM/ MFR  JK 10' JM 10' JK         R GHJ mobs             Scap PROM    Jk         R cold laser -biceps tendon             K tape V down   2 'Jk + bicep inhib 2' JM    + bicep inhib    defer                      Neuro Re-Ed             TB LPD  GTB 15 GTB 15 GTB 20         TB row  GTB 15 GTB 15 GTB 20         TB IR  GTB 15 GTB 15 GTB 20         Bilat TB ER  OTB 15 OTB 15 GTB 20         Prone squeeze and hold  10x5\" 15x5\" SL 20x5\"         Prone row  10 1# x10 1# 15x         Prone W  10 1# x10 1# 15x         Prone T                          Ther Ex             BW UBE for postural strengthening  2'/2' 2'/2' 2'/2'         S/l sld ER AROM  1# 15 1# 20 1# 20x         S/l shld flexion  10 15 20         HEP instruct 8' gr and orange tb            Body blade IR/ER              Shld flexion trio 3 dir    1 lb 10x ea                                                             Ther Activity                                       Gait Training                                       Modalities   "
no

## 2024-12-06 NOTE — ED ADULT NURSE NOTE - HISTORY OF COVID-19 VACCINATION
This nurse and MD Amber at bedside. ADIN performed at this time. Patient tolerated well. Care ongoing.    Yes